# Patient Record
Sex: FEMALE | Race: WHITE | NOT HISPANIC OR LATINO | Employment: FULL TIME | URBAN - METROPOLITAN AREA
[De-identification: names, ages, dates, MRNs, and addresses within clinical notes are randomized per-mention and may not be internally consistent; named-entity substitution may affect disease eponyms.]

---

## 2017-03-20 DIAGNOSIS — M25.551 RIGHT HIP PAIN: Primary | ICD-10-CM

## 2017-04-11 ENCOUNTER — OFFICE VISIT (OUTPATIENT)
Dept: ORTHOPEDICS | Facility: CLINIC | Age: 32
End: 2017-04-11

## 2017-04-11 VITALS — WEIGHT: 104 LBS | BODY MASS INDEX: 22.44 KG/M2 | HEIGHT: 57 IN

## 2017-04-11 DIAGNOSIS — M21.851 ACQUIRED DYSPLASIA OF RIGHT HIP: Primary | ICD-10-CM

## 2017-04-11 NOTE — PROGRESS NOTES
Subjective:   Dalila Rosario is a 32 year old female who is here complaining of right hip pain. She notes lateral hip pain. She has a left leg amputation in 1/16 by . She has scoliosis.     Dalila has a long past history of prior surgeries.  She was treated throughout her earlier years at [facility] with a history of anywhere from 25-30 surgical procedures on her lower extremities.  She most recently underwent an above-the-knee amputation by Dr. Enrique for failed total knee revision.  She has done quite well post amputation.  The problem seems to be that her right hip is causing her more pain and discomfort.  She denies any history of trauma or specific injury to it.  She has had prior surgeries due to fracture on her left hip.  She states the right hip is taking the brunt of her weightbearing as she is primarily on crutches.  She is still awaiting the prosthetic for her left lower extremity.  She states she has primarily right groin discomfort.  It gets worse as she spends more time on her right lower extremity.      She has current stable housing and is living in accessible housing for herself.       Background:   Date of injury: NA   Duration of symptoms: 6 months  Mechanism of Injury: Chronic; Unknown   Aggravating factors: walking  Relieving Factors: rest  Prior Evaluation: Prior Physician Evalutation: 2 years ago by  11/15    PAST MEDICAL, SOCIAL, SURGICAL AND FAMILY HISTORY: She  has a past medical history of Anxiety; Attempted suicide (H); History of blood transfusion; Major depression; Myelodysplasia of the spinal cord (H); OCD (obsessive compulsive disorder); and Other chronic pain. She also has no past medical history of PONV (postoperative nausea and vomiting).  She  has a past surgical history that includes orthopedic surgery; GYN surgery; Arthroplasty revision knee (Left, 8/3/2015); Remove hardware lower extremity (Left, 8/3/2015); Irrigation and debridement knee, combined  "(Left, 11/30/2015); Exchange poly component arthroplasty knee (Left, 11/30/2015); Amputate revision stump lower extremity (Left, 1/9/2016); Amputate leg above knee (Left, 1/7/2016); and Remove hardware arthroplasty knee (Left, 1/7/2016).  Her family history is not on file.  She reports that she has been smoking Cigarettes.  She has been smoking about 0.50 packs per day. She has never used smokeless tobacco. She reports that she does not drink alcohol or use illicit drugs.    ALLERGIES: She is allergic to droperidol and benzoin tincture [benzoin].    CURRENT MEDICATIONS: She has a current medication list which includes the following prescription(s): mirtazapine, bupropion, gabapentin, acetaminophen, hydroxyzine, ciprofloxacin, hydrocodone-acetaminophen, lamotrigine, methocarbamol, hydromorphone, aspirin, quetiapine, order for dme, order for dme, and order for dme.     REVIEW OF SYSTEMS: 12 point review of systems is negative except as noted above.     Exam:   Ht 4' 8.5\" (1.435 m)  Wt 104 lb (47.2 kg)  BMI 22.91 kg/m2      CONSTITUTIONIAL: healthy, alert and no distress  HEAD: Normocephalic. No masses, lesions, tenderness or abnormalities  SKIN: no suspicious lesions or rashes  NEUROLOGIC: Non-focal  PSYCHIATRIC: mentation appears normal.  RIGHT HIP:  She has increased range of motion in internal rotation, external rotation.  She has pain with full flexion as well as full internal rotation and external rotation and pain as noted in the groin.  She has positive FADIR and positive KOFI.  She is mildly tender to palpation in the femoral triangle and moderately tender to palpation over the greater trochanter.      IMAGING:  Radiographs are reviewed and demonstrate left hip intramedullary nail as well as right hip dysplasia.  There is no evidence of degenerative changes.      ASSESSMENT:  Dalila is a 32-year-old female who has increased weightbearing on the right lower extremity, particularly with a load and unloading " fashion as she maneuvers around on a single lower extremity.  She has been delayed in getting her prosthesis and that has a clear and direct effect on the right hip.  She does have some underlying dysplasia.  With this juncture, we will proceed with an MRI of the hip to evaluate her articular cartilage.  If she has predominantly degenerative changes, then we will attempt an intraarticular corticosteroid injection.  If she has a minimum of degenerative changes, then I am going to have her see one of our hip surgeons to talk about potential management or other options.  She is not going to follow up with me pending the MRI, and we discussed this.  We will do this via the telephone and she is aware of how our options are laid out.  If something is unexpected, then by all means I will call her and have her return for followup.        All questions were answered.

## 2017-04-11 NOTE — LETTER
4/11/2017      RE: Dalila Rosario  [address]    [facility] [address]        Subjective:   Dalila Rosario is a 32 year old female who is here complaining of right hip pain. She notes lateral hip pain. She has a left leg amputation in 1/16 by . She has scoliosis.     Dalila has a long past history of prior surgeries.  She was treated throughout her earlier years at [facility] with a history of anywhere from 25-30 surgical procedures on her lower extremities.  She most recently underwent an above-the-knee amputation by Dr. Enrique for failed total knee revision.  She has done quite well post amputation.  The problem seems to be that her right hip is causing her more pain and discomfort.  She denies any history of trauma or specific injury to it.  She has had prior surgeries due to fracture on her left hip.  She states the right hip is taking the brunt of her weightbearing as she is primarily on crutches.  She is still awaiting the prosthetic for her left lower extremity.  She states she has primarily right groin discomfort.  It gets worse as she spends more time on her right lower extremity.      She has current stable housing and is living in accessible housing for herself.       Background:   Date of injury: NA   Duration of symptoms: 6 months  Mechanism of Injury: Chronic; Unknown   Aggravating factors: walking  Relieving Factors: rest  Prior Evaluation: Prior Physician Evalutation: 2 years ago by  11/15    PAST MEDICAL, SOCIAL, SURGICAL AND FAMILY HISTORY: She  has a past medical history of Anxiety; Attempted suicide (H); History of blood transfusion; Major depression; Myelodysplasia of the spinal cord (H); OCD (obsessive compulsive disorder); and Other chronic pain. She also has no past medical history of PONV (postoperative nausea and vomiting).  She  has a past surgical history that includes orthopedic surgery; GYN surgery; Arthroplasty revision knee (Left, 8/3/2015); Remove  "hardware lower extremity (Left, 8/3/2015); Irrigation and debridement knee, combined (Left, 11/30/2015); Exchange poly component arthroplasty knee (Left, 11/30/2015); Amputate revision stump lower extremity (Left, 1/9/2016); Amputate leg above knee (Left, 1/7/2016); and Remove hardware arthroplasty knee (Left, 1/7/2016).  Her family history is not on file.  She reports that she has been smoking Cigarettes.  She has been smoking about 0.50 packs per day. She has never used smokeless tobacco. She reports that she does not drink alcohol or use illicit drugs.    ALLERGIES: She is allergic to droperidol and benzoin tincture [benzoin].    CURRENT MEDICATIONS: She has a current medication list which includes the following prescription(s): mirtazapine, bupropion, gabapentin, acetaminophen, hydroxyzine, ciprofloxacin, hydrocodone-acetaminophen, lamotrigine, methocarbamol, hydromorphone, aspirin, quetiapine, order for dme, order for dme, and order for dme.     REVIEW OF SYSTEMS: 12 point review of systems is negative except as noted above.     Exam:   Ht 4' 8.5\" (1.435 m)  Wt 104 lb (47.2 kg)  BMI 22.91 kg/m2      CONSTITUTIONIAL: healthy, alert and no distress  HEAD: Normocephalic. No masses, lesions, tenderness or abnormalities  SKIN: no suspicious lesions or rashes  NEUROLOGIC: Non-focal  PSYCHIATRIC: mentation appears normal.  RIGHT HIP:  She has increased range of motion in internal rotation, external rotation.  She has pain with full flexion as well as full internal rotation and external rotation and pain as noted in the groin.  She has positive FADIR and positive KOFI.  She is mildly tender to palpation in the femoral triangle and moderately tender to palpation over the greater trochanter.      IMAGING:  Radiographs are reviewed and demonstrate left hip intramedullary nail as well as right hip dysplasia.  There is no evidence of degenerative changes.      ASSESSMENT:  Dalila is a 32-year-old female who has " increased weightbearing on the right lower extremity, particularly with a load and unloading fashion as she maneuvers around on a single lower extremity.  She has been delayed in getting her prosthesis and that has a clear and direct effect on the right hip.  She does have some underlying dysplasia.  With this juncture, we will proceed with an MRI of the hip to evaluate her articular cartilage.  If she has predominantly degenerative changes, then we will attempt an intraarticular corticosteroid injection.  If she has a minimum of degenerative changes, then I am going to have her see one of our hip surgeons to talk about potential management or other options.  She is not going to follow up with me pending the MRI, and we discussed this.  We will do this via the telephone and she is aware of how our options are laid out.  If something is unexpected, then by all means I will call her and have her return for followup.        All questions were answered.     Yefri Grajeda MD

## 2017-04-11 NOTE — MR AVS SNAPSHOT
After Visit Summary   4/11/2017    Dalila Rosario    MRN: [id number]           Patient Information     Date Of Birth          1985        Visit Information        Provider Department      4/11/2017 10:00 AM Yefri Grajeda MD [facility]        Today's Diagnoses     Acquired dysplasia of right hip    -  1       Follow-ups after your visit        Your next 10 appointments already scheduled     Apr 20, 2017 10:15 AM CDT   MR LOWER EXTREMITY JOINT RIGHT W/O CONTRAST [facility]   [facility]    [address] [place] [address] [id number] [phone]           Take your medicines as usual, unless your doctor tells you not to. Bring a list of your current medicines to your exam (including vitamins, minerals and over-the-counter drugs). Also bring the results of similar scans you may have had.  Please remove any body piercings and hair extensions before you arrive.  Follow your doctor s orders. If you do not, we may have to postpone your exam.  You will not have contrast for this exam. You do not need to do anything special to prepare.  The MRI machine uses a strong magnet. Please wear clothes without metal (snaps, zippers). A sweatsuit works well, or we may give you a hospital gown.   **IMPORTANT** THE INSTRUCTIONS BELOW ARE ONLY FOR THOSE PATIENTS WHO HAVE BEEN TOLD THEY WILL RECEIVE SEDATION OR GENERAL ANESTHESIA DURING THEIR MRI PROCEDURE:  IF YOU WILL RECEIVE SEDATION (take medicine to help you relax during your exam):   You must get the medicine from your doctor before you arrive. Bring the medicine to the exam. Do not take it at home.   Arrive one hour early. Bring someone who can take you home after the test. Your medicine will make you sleepy. After the exam, you may not drive, take a bus or take a taxi by yourself.   No eating 8 hours before your exam. You may have clear liquids up until 4 hours before your exam.  (Clear liquids include water, clear tea, black coffee and fruit juice without pulp.)  IF YOU WILL RECEIVE ANESTHESIA (be asleep for your exam):   Arrive 1 1/2 hours early. Bring someone who can take you home after the test. You may not drive, take a bus or take a taxi by yourself.   No eating 8 hours before your exam. You may have clear liquids up until 4 hours before your exam. (Clear liquids include water, clear tea, black coffee and fruit juice without pulp.)   You will spend four to five hours in the recovery room.  Please call the Imaging Department at your exam site with any questions.              Future tests that were ordered for you today     Open Future Orders        Priority Expected Expires Ordered    MRI Lower extremity joint w/o contrast rt* Routine  2018            Who to contact     Please call your clinic at [phone] to:    Ask questions about your health    Make or cancel appointments    Discuss your medicines    Learn about your test results    Speak to your doctor   If you have compliments or concerns about an experience at your clinic, or if you wish to file a complaint, please contact [facility] Patient Relations at [phone] or email us at [email or web address]         Additional Information About Your Visit        [employer] Information     Nomacorc is an electronic gateway that provides easy, online access to your medical records. With Nom[employer]orc, you can request a clinic appointment, read your test results, renew a prescription or communicate with your care team.     To sign up for [employer] visit the website at [email or web address]   You will be asked to enter the access code listed below, as well as some personal information. Please follow the directions to create your username and password.     Your access code is: [email or web address]  Expires: 7/10/2017  3:55 PM     Your access code will  in 90 days. If you need help or a new  "code, please contact your [facility] or call [phone] for assistance.        Care EveryWhere ID     This is your Care EveryWhere ID. This could be used by other organizations to access your [facility] medical records  [id number]        Your Vitals Were     Height BMI (Body Mass Index)                4' 8.5\" (1.435 m) 22.91 kg/m2           Blood Pressure from Last 3 Encounters:   01/15/16 130/79   01/06/16 124/76   12/18/15 123/76    Weight from Last 3 Encounters:   04/11/17 104 lb (47.2 kg)   01/07/16 132 lb 11.5 oz (60.2 kg)   01/06/16 129 lb (58.5 kg)               Primary Care Provider Office Phone # Fax #    Alejandrina Bui [phone]       [facility] [address] [place] [id number]        Thank you!     Thank you for choosing [facility]  for your care. Our goal is always to provide you with excellent care. Hearing back from our patients is one way we can continue to improve our services. Please take a few minutes to complete the written survey that you may receive in the mail after your visit with us. Thank you!             Your Updated Medication List - Protect others around you: Learn how to safely use, store and throw away your medicines at www.disposemymeds.org.          This list is accurate as of: 4/11/17  3:55 PM.  Always use your most recent med list.                   Brand Name Dispense Instructions for use    acetaminophen 500 MG tablet    TYLENOL    1 Bottle    Take 1 tablet (500 mg) by mouth every 4 hours       aspirin 325 MG tablet     30 tablet    Take 1 tablet (325 mg) by mouth daily       buPROPion 150 MG 24 hr tablet    WELLBUTRIN XL    30 tablet    Take 1 tablet (150 mg) by mouth every morning       ciprofloxacin 500 MG tablet    CIPRO    20 tablet    Take 1 tablet (500 mg) by mouth 2 times daily       gabapentin 300 MG capsule    NEURONTIN    90 capsule    Take 3 capsules (900 mg) by mouth At " Bedtime       HYDROcodone-acetaminophen 5-325 MG per tablet    NORCO    15 tablet    Take 1 tablet by mouth every 6 hours as needed for moderate to severe pain       HYDROmorphone 2 MG tablet    DILAUDID    100 tablet    Take 1-2 tablets (2-4 mg) by mouth every 3 hours as needed for moderate to severe pain       hydrOXYzine 25 MG tablet    ATARAX    120 tablet    Take 1-2 tablets (25-50 mg) by mouth every 4 hours as needed for other (adjuvant pain)       lamoTRIgine 200 MG tablet    LaMICtal    30 tablet    Take 1 tablet (200 mg) by mouth daily       methocarbamol 750 MG tablet    ROBAXIN    30 tablet    Take 1 tablet (750 mg) by mouth 4 times daily as needed for muscle spasms       * order for DME     1 Units    Wheelchair       * order for DME     1 Units    Evaluation for wheel chair. Pt currently has wheel chair, is not working to meet her needs. Evaluation to determine exactly what is needed.       * order for DME     200 catheter    Equipment being ordered: 14 french urinary catheter  NDC: [address]-08 Name: Self-cath, 14-Fr 6 inch catheter  May use 6-10 per day as needed. Should last for approximately one month       QUEtiapine 25 MG tablet    SEROquel    60 tablet    Take 1-2 tablets (25-50 mg) by mouth nightly as needed (sleep)       REMERON PO      Take 30 mg by mouth At Bedtime       * Notice:  This list has 3 medication(s) that are the same as other medications prescribed for you. Read the directions carefully, and ask your doctor or other care provider to review them with you.

## 2017-04-20 ENCOUNTER — RADIANT APPOINTMENT (OUTPATIENT)
Dept: MRI IMAGING | Facility: CLINIC | Age: 32
End: 2017-04-20
Attending: FAMILY MEDICINE
Payer: COMMERCIAL

## 2017-04-20 DIAGNOSIS — M21.851 ACQUIRED DYSPLASIA OF RIGHT HIP: ICD-10-CM

## 2017-04-20 PROCEDURE — 73721 MRI JNT OF LWR EXTRE W/O DYE: CPT | Mod: RT | Performed by: RADIOLOGY

## 2017-05-01 DIAGNOSIS — M25.551 RIGHT HIP PAIN: ICD-10-CM

## 2017-05-01 DIAGNOSIS — M16.31 OSTEOARTHRITIS RESULTING FROM RIGHT HIP DYSPLASIA: Primary | ICD-10-CM

## 2017-06-07 ENCOUNTER — PRE VISIT (OUTPATIENT)
Dept: ORTHOPEDICS | Facility: CLINIC | Age: 32
End: 2017-06-07

## 2017-06-07 NOTE — TELEPHONE ENCOUNTER
Tsering from [facility] called and said she will pull the charts and send pt's recs once she gathers all of pt's recs.

## 2017-06-07 NOTE — TELEPHONE ENCOUNTER
1.  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 Right hip pain hx of hip surgery at young age, appt per pt.  Ref by Dr. Grajeda in Sports Med, med and imaging in Epic chart.  2.  Referring provider: Dr. Grajeda   3.  Call to patient (Yes / No - short description): No, pt was referred.   4.  Previous care at / records requested from:  [facility] Taras HERNANDEZ -- Recs are in Epic / Images in PACS   [facility] -- Recs are in Epic / Images in PACS  [facility] HERNANDEZ -- Recs are in [facility] - Attempt to fax cover sheet to emily bedoya

## 2017-06-22 ENCOUNTER — OFFICE VISIT (OUTPATIENT)
Dept: ORTHOPEDICS | Facility: CLINIC | Age: 32
End: 2017-06-22

## 2017-06-22 VITALS — HEIGHT: 57 IN

## 2017-06-22 DIAGNOSIS — M16.31 OSTEOARTHRITIS RESULTING FROM RIGHT HIP DYSPLASIA: Primary | ICD-10-CM

## 2017-06-22 ASSESSMENT — ACTIVITIES OF DAILY LIVING (ADL)
ADL_SUBSCALE_SCORE: 22.05
ADL_SUM: 53
ADL_MEAN: 3.11

## 2017-06-22 ASSESSMENT — ENCOUNTER SYMPTOMS
NECK PAIN: 1
NUMBNESS: 1
INCREASED ENERGY: 1
FEVER: 0
MUSCLE CRAMPS: 1
LOSS OF CONSCIOUSNESS: 0
ALTERED TEMPERATURE REGULATION: 1
DYSURIA: 0
MEMORY LOSS: 0
TACHYCARDIA: 1
WEAKNESS: 1
PARALYSIS: 0
JOINT SWELLING: 1
HYPERTENSION: 0
POLYPHAGIA: 0
FATIGUE: 1
CLAUDICATION: 0
EXERCISE INTOLERANCE: 1
ORTHOPNEA: 0
SEIZURES: 0
HALLUCINATIONS: 0
POLYDIPSIA: 0
DIFFICULTY URINATING: 0
HEMATURIA: 0
INSOMNIA: 1
PALPITATIONS: 0
MUSCLE WEAKNESS: 1
MYALGIAS: 1
DISTURBANCES IN COORDINATION: 1
WEIGHT GAIN: 1
TREMORS: 0
STIFFNESS: 1
NERVOUS/ANXIOUS: 1
HYPOTENSION: 0
LIGHT-HEADEDNESS: 1
DEPRESSION: 1
SPEECH CHANGE: 0
LEG SWELLING: 1
SYNCOPE: 0
ARTHRALGIAS: 1
DECREASED CONCENTRATION: 0
FLANK PAIN: 1
HEADACHES: 1
WEIGHT LOSS: 1
LEG PAIN: 1
DIZZINESS: 1
PANIC: 1
BACK PAIN: 1
DECREASED APPETITE: 1
NIGHT SWEATS: 0
TINGLING: 1
SLEEP DISTURBANCES DUE TO BREATHING: 0
CHILLS: 0

## 2017-06-22 ASSESSMENT — HOOS S4: HOW SEVERE IS YOUR HIP JOINT STIFFNESS AFTER FIRST WAKENING IN THE MORNING?: MODERATE

## 2017-06-22 NOTE — NURSING NOTE
"Reason For Visit:   Chief Complaint   Patient presents with     Consult     Pt. states that she is here today for Right Hip Pain. She been having ongoing pain for 6 months, no injuries. Referring:  ODALIS YU       Pain Assessment  Patient Currently in Pain: Yes  0-10 Pain Scale: 8  Primary Pain Location: Hip  Pain Orientation: Right  Pain Descriptors: Discomfort  Alleviating Factors: NSAIDS, Rest  Aggravating Factors: Movement, Walking, Standing, Sitting               HEIGHT: 4' 8.5\", WEIGHT: 0 lbs 0 oz, BMI: There is no height or weight on file to calculate BMI.      Current Outpatient Prescriptions   Medication Sig Dispense Refill     Mirtazapine (REMERON PO) Take 30 mg by mouth At Bedtime       ciprofloxacin (CIPRO) 500 MG tablet Take 1 tablet (500 mg) by mouth 2 times daily 20 tablet 0     buPROPion (WELLBUTRIN XL) 150 MG 24 hr tablet Take 1 tablet (150 mg) by mouth every morning 30 tablet 5     gabapentin (NEURONTIN) 300 MG capsule Take 3 capsules (900 mg) by mouth At Bedtime 90 capsule 5     lamoTRIgine (LAMICTAL) 200 MG tablet Take 1 tablet (200 mg) by mouth daily 30 tablet 0     methocarbamol (ROBAXIN) 750 MG tablet Take 1 tablet (750 mg) by mouth 4 times daily as needed for muscle spasms 30 tablet 0     acetaminophen (TYLENOL) 500 MG tablet Take 1 tablet (500 mg) by mouth every 4 hours 1 Bottle 0     aspirin 325 MG tablet Take 1 tablet (325 mg) by mouth daily 30 tablet 0     hydrOXYzine (ATARAX) 25 MG tablet Take 1-2 tablets (25-50 mg) by mouth every 4 hours as needed for other (adjuvant pain) 120 tablet 1     QUEtiapine (SEROQUEL) 25 MG tablet Take 1-2 tablets (25-50 mg) by mouth nightly as needed (sleep) 60 tablet 0     order for DME Equipment being ordered: 14 french urinary catheter    NDC: [address]-11  Name: Self-cath, 14-Fr 6 inch catheter    May use 6-10 per day as needed. Should last for approximately one month 200 catheter 5     ORDER FOR DME Evaluation for wheel chair. Pt currently has wheel " chair, is not working to meet her needs. Evaluation to determine exactly what is needed. 1 Units 0     ORDER FOR DME Wheelchair 1 Units 0          Allergies   Allergen Reactions     Droperidol Anaphylaxis     Benzoin Tincture [Benzoin] Blisters

## 2017-06-22 NOTE — PROGRESS NOTES
Chief Complaint: Consult (Pt. states that she is here today for Right Hip Pain. She been having ongoing pain for 6 months, no injuries. Referring:  ODALIS YU)      Physician:  Odalis Yu    HPI: Dalila Rosario is a 32 year old female who presents today for evaluation of her right hip    Symptom Profile  Location of symptoms:  Latera hip at the level of the greater trochanter. She also endorses some groin pain   Onset: insidious  Duration of symptoms: about 6 months   Quality of symptoms: sharp  Severity: moderate to severe   Alleviate: activity modification   Exacerbating: activities   Previous Treatments: Previous treatments include activity modification, oral pain medication    No steroid injection, no physical therapy.     Current Status:  Results of the patient s Hip Disability and Osteoarthritis Outcome Score (HOOS)  are as follows (0-100 scales with 100 being the theoretical best):  Pain: 22.5  Symptoms:35  ADLs:22.05  Sports/Recreation:6.25  Quality of Life:12.5  ([email or web address].nu/)    MEDICAL HISTORY:   Past Medical History:   Diagnosis Date     Anxiety      Attempted suicide (H)      History of blood transfusion      Major depression      Myelodysplasia of the spinal cord (H)      OCD (obsessive compulsive disorder)      Other chronic pain    myelo level: pt does not know    Pertinent negatives:  Patient has no history of DVT or PE. Discussed risk factors.    Medications:     Current Outpatient Prescriptions:      Mirtazapine (REMERON PO), Take 30 mg by mouth At Bedtime, Disp: , Rfl:      ciprofloxacin (CIPRO) 500 MG tablet, Take 1 tablet (500 mg) by mouth 2 times daily, Disp: 20 tablet, Rfl: 0     buPROPion (WELLBUTRIN XL) 150 MG 24 hr tablet, Take 1 tablet (150 mg) by mouth every morning, Disp: 30 tablet, Rfl: 5     gabapentin (NEURONTIN) 300 MG capsule, Take 3 capsules (900 mg) by mouth At Bedtime, Disp: 90 capsule, Rfl: 5     lamoTRIgine (LAMICTAL) 200 MG tablet, Take 1 tablet (200 mg) by  mouth daily, Disp: 30 tablet, Rfl: 0     methocarbamol (ROBAXIN) 750 MG tablet, Take 1 tablet (750 mg) by mouth 4 times daily as needed for muscle spasms, Disp: 30 tablet, Rfl: 0     acetaminophen (TYLENOL) 500 MG tablet, Take 1 tablet (500 mg) by mouth every 4 hours, Disp: 1 Bottle, Rfl: 0     aspirin 325 MG tablet, Take 1 tablet (325 mg) by mouth daily, Disp: 30 tablet, Rfl: 0     hydrOXYzine (ATARAX) 25 MG tablet, Take 1-2 tablets (25-50 mg) by mouth every 4 hours as needed for other (adjuvant pain), Disp: 120 tablet, Rfl: 1     QUEtiapine (SEROQUEL) 25 MG tablet, Take 1-2 tablets (25-50 mg) by mouth nightly as needed (sleep), Disp: 60 tablet, Rfl: 0     order for DME, Equipment being ordered: 14 french urinary catheter  NDC: [address]-90 Name: Self-cath, 14-Fr 6 inch catheter  May use 6-10 per day as needed. Should last for approximately one month, Disp: 200 catheter, Rfl: 5     ORDER FOR DME, Evaluation for wheel chair. Pt currently has wheel chair, is not working to meet her needs. Evaluation to determine exactly what is needed., Disp: 1 Units, Rfl: 0     ORDER FOR DME, Wheelchair, Disp: 1 Units, Rfl: 0    Allergies: Droperidol and Benzoin tincture [benzoin]    SURGICAL HISTORY:   Past Surgical History:   Procedure Laterality Date     AMPUTATE LEG ABOVE KNEE Left 2016    Procedure: AMPUTATE LEG ABOVE KNEE;  Surgeon: Shubham Enrique MD;  [facility]     AMPUTATE REVISION STUMP LOWER EXTREMITY Left 2016    Procedure: AMPUTATE REVISION STUMP LOWER EXTREMITY;  Surgeon: Shubham Enrique MD;  [facility]     ARTHROPLASTY REVISION KNEE Left 8/3/2015    Procedure: ARTHROPLASTY REVISION KNEE;  Surgeon: Shubham Enrique MD;  [facility]     EXCHANGE POLY COMPONENT ARTHROPLASTY KNEE Left 2015    Procedure: EXCHANGE POLY COMPONENT ARTHROPLASTY KNEE;  Surgeon: Shubham Enrique MD;  [facility]     GYN SURGERY       x3     IRRIGATION AND DEBRIDEMENT KNEE, COMBINED Left  "11/30/2015    Procedure: COMBINED IRRIGATION AND DEBRIDEMENT KNEE;  Surgeon: Shubham Enrique MD;  [facility]     ORTHOPEDIC SURGERY      22 on legs and bladder due to myelodisplasia of spinal column.      REMOVE HARDWARE ARTHROPLASTY KNEE Left 1/7/2016    Procedure: REMOVE HARDWARE ARTHROPLASTY KNEE;  Surgeon: Shubham Enrique MD;  [facility]     REMOVE HARDWARE LOWER EXTREMITY Left 8/3/2015    Procedure: REMOVE HARDWARE LOWER EXTREMITY;  Surgeon: Shubham Enrique MD;  [facility]   left hip proximal femoral osteotomy non-union and placement of a DHS per pt report    FAMILY HISTORY: No family history on file.    SOCIAL HISTORY:   Social History   Substance Use Topics     Smoking status: Current Every Day Smoker     Packs/day: 0.50     Types: Cigarettes     Smokeless tobacco: Never Used     Alcohol use No      Comment: Sober x 1 year   not working currently, she is an  with a masters  Lives alone in friends house independently    REVIEW OF SYSTEMS:  The comprehensive review of systems from the intake form was reviewed with the patient.  No fever, weight change or fatigue. No dry eyes. No oral ulcers, sore throat or voice change. No palpitations, syncope, angina or edema.  No chest pain, excessive sleepiness, shortness of breath or hemoptysis.   No abdominal pain, nausea, vomiting, diarrhea or heartburn.  No skin rash. No focal weakness or numbness. No bleeding or lymphadenopathy. No rhinitis or hives.     Exam:  On physical examination the patient appears the stated age, is in no acute distress, affectThe is appropriate, and breathing is non-labored.  Vitals are documented in the EMR and have been reviewed:    Ht 1.435 m (4' 8.5\")  4' 8.5\"  There is no height or weight on file to calculate BMI.    Rises from chair: with effort on her single lower extremity     Log role with pain radiating down the leg below the knee    RIGHT hip subjective:  Abd:10  Add:20 with gaurding  PFC:  Flexion: " 90  IRF:10 c-sign  ERF: skipped 2/2 pain   Impingement test:  Resisted straight leg raise:  KOFI:    Distally, the circulatory, motor, and sensation exam is intact with 5/5 EHL, gastroc-soleus, and tibialis anterior.  Sensation to light touch is intact.  Dorsalis pedis and posterior tibialis pulses are palpable.  There are no sores on the feet, no bruising, and no lymphedema.    X-rays:   Right hip with classic acetabular dysplasia with LCE of -6   MRI with a large subchondral cysts    Assessment: This is a 32 year old with severe acetabular dysplasia and an MR to suggest that her cartilage is not appropriate for a redirectional osteotomy. We discussed the non-operative management options available including intra-articular steroid injection.      Plan:  Referral to non-operative orthopaedics for consideration of intra-articular steroid injection.     Answers for HPI/ROS submitted by the patient on 6/22/2017   General Symptoms: Yes  Skin Symptoms: No  HENT Symptoms: No  EYE SYMPTOMS: No  HEART SYMPTOMS: Yes  LUNG SYMPTOMS: No  INTESTINAL SYMPTOMS: No  URINARY SYMPTOMS: Yes  GYNECOLOGIC SYMPTOMS: No  BREAST SYMPTOMS: No  SKELETAL SYMPTOMS: Yes  BLOOD SYMPTOMS: No  NERVOUS SYSTEM SYMPTOMS: Yes  MENTAL HEALTH SYMPTOMS: Yes  Fever: No  Loss of appetite: Yes  Weight loss: Yes  Weight gain: Yes  Fatigue: Yes  Night sweats: No  Chills: No  Increased stress: Yes  Excessive hunger: No  Excessive thirst: No  Feeling hot or cold when others believe the temperature is normal: Yes  Loss of height: No  Post-operative complications: No  Surgical site pain: No  Hallucinations: No  Change in or Loss of Energy: Yes  Hyperactivity: Yes  Confusion: No  Chest pain or pressure: Yes  Fast or irregular heartbeat: No  Pain in legs with walking: Yes  Swelling in feet or ankles: Yes  Trouble breathing while lying down: No  Fingers or Toes appear blue: No  High blood pressure: No  Low blood pressure: No  Fainting: No  Murmurs: Yes  Chest pain  on exertion: Yes  Chest pain at rest: Yes  Cramping pain in leg during exercise: No  Pacemaker: No  Varicose veins: No  Edema or swelling: No  Fast heart beat: Yes  Wake up at night with shortness of breath: No  Heart flutters: No  Light-headedness: Yes  Exercise intolerance: Yes  Trouble holding urine or incontinence: Yes  Pain or burning: No  Trouble starting or stopping: No  Increased frequency of urination: No  Blood in urine: No  Decreased frequency of urination: Yes  Frequent nighttime urination: Yes  Flank pain: Yes  Difficulty emptying bladder: No  Back pain: Yes  Muscle aches: Yes  Neck pain: Yes  Swollen joints: Yes  Joint pain: Yes  Bone pain: Yes  Muscle cramps: Yes  Muscle weakness: Yes  Joint stiffness: Yes  Bone fracture: No  Trouble with coordination: Yes  Dizziness or trouble with balance: Yes  Fainting or black-out spells: No  Memory loss: No  Headache: Yes  Seizures: No  Speech problems: No  Tingling: Yes  Tremor: No  Weakness: Yes  Difficulty walking: Yes  Paralysis: No  Numbness: Yes  Nervous or Anxious: Yes  Depression: Yes  Trouble sleeping: Yes  Trouble thinking or concentrating: No  Mood changes: Yes  Panic attacks: Yes

## 2017-06-22 NOTE — LETTER
6/22/2017       RE: Dalila Rosario  [address] [facility] [address]     Dear Colleague,    Thank you for referring your patient, Dalila Rosario, to the [facility] at [facility]. Please see a copy of my visit note below.    Chief Complaint: Consult (Pt. states that she is here today for Right Hip Pain. She been having ongoing pain for 6 months, no injuries. Referring:  ODALIS YU)    Physician:  Odalis Yu    HPI: Dalila Rosario is a 32 year old female who presents today for evaluation of her right hip    Symptom Profile  Location of symptoms:  Latera hip at the level of the greater trochanter. She also endorses some groin pain   Onset: insidious  Duration of symptoms: about 6 months   Quality of symptoms: sharp  Severity: moderate to severe   Alleviate: activity modification   Exacerbating: activities   Previous Treatments: Previous treatments include activity modification, oral pain medication    No steroid injection, no physical therapy.     Current Status:  Results of the patient s Hip Disability and Osteoarthritis Outcome Score (HOOS)  are as follows (0-100 scales with 100 being the theoretical best):  Pain: 22.5  Symptoms:35  ADLs:22.05  Sports/Recreation:6.25  Quality of Life:12.5  ([email or web address].[email or web address]/)    MEDICAL HISTORY:   Past Medical History:   Diagnosis Date     Anxiety      Attempted suicide (H)      History of blood transfusion      Major depression      Myelodysplasia of the spinal cord (H)      OCD (obsessive compulsive disorder)      Other chronic pain    myelo level: pt does not know    Pertinent negatives:  Patient has no history of DVT or PE. Discussed risk factors.    Medications:     Current Outpatient Prescriptions:      Mirtazapine (REMERON PO), Take 30 mg by mouth At Bedtime, Disp: , Rfl:      ciprofloxacin (CIPRO) 500 MG tablet, Take 1 tablet (500 mg) by mouth 2 times daily, Disp: 20 tablet, Rfl: 0      buPROPion (WELLBUTRIN XL) 150 MG 24 hr tablet, Take 1 tablet (150 mg) by mouth every morning, Disp: 30 tablet, Rfl: 5     gabapentin (NEURONTIN) 300 MG capsule, Take 3 capsules (900 mg) by mouth At Bedtime, Disp: 90 capsule, Rfl: 5     lamoTRIgine (LAMICTAL) 200 MG tablet, Take 1 tablet (200 mg) by mouth daily, Disp: 30 tablet, Rfl: 0     methocarbamol (ROBAXIN) 750 MG tablet, Take 1 tablet (750 mg) by mouth 4 times daily as needed for muscle spasms, Disp: 30 tablet, Rfl: 0     acetaminophen (TYLENOL) 500 MG tablet, Take 1 tablet (500 mg) by mouth every 4 hours, Disp: 1 Bottle, Rfl: 0     aspirin 325 MG tablet, Take 1 tablet (325 mg) by mouth daily, Disp: 30 tablet, Rfl: 0     hydrOXYzine (ATARAX) 25 MG tablet, Take 1-2 tablets (25-50 mg) by mouth every 4 hours as needed for other (adjuvant pain), Disp: 120 tablet, Rfl: 1     QUEtiapine (SEROQUEL) 25 MG tablet, Take 1-2 tablets (25-50 mg) by mouth nightly as needed (sleep), Disp: 60 tablet, Rfl: 0     order for DME, Equipment being ordered: 14 french urinary catheter  NDC: [address]-07 Name: Self-cath, 14-Fr 6 inch catheter  May use 6-10 per day as needed. Should last for approximately one month, Disp: 200 catheter, Rfl: 5     ORDER FOR DME, Evaluation for wheel chair. Pt currently has wheel chair, is not working to meet her needs. Evaluation to determine exactly what is needed., Disp: 1 Units, Rfl: 0     ORDER FOR DME, Wheelchair, Disp: 1 Units, Rfl: 0    Allergies: Droperidol and Benzoin tincture [benzoin]  SURGICAL HISTORY:   Past Surgical History:   Procedure Laterality Date     AMPUTATE LEG ABOVE KNEE Left 1/7/2016    Procedure: AMPUTATE LEG ABOVE KNEE;  Surgeon: Shubham Enrique MD;  [facility]     AMPUTATE REVISION STUMP LOWER EXTREMITY Left 1/9/2016    Procedure: AMPUTATE REVISION STUMP LOWER EXTREMITY;  Surgeon: Shubham Enrique MD;  [facility]     ARTHROPLASTY REVISION KNEE Left 8/3/2015    Procedure: ARTHROPLASTY REVISION KNEE;  Surgeon:  Shubham Enrique MD;  [facility]     EXCHANGE POLY COMPONENT ARTHROPLASTY KNEE Left 2015    Procedure: EXCHANGE POLY COMPONENT ARTHROPLASTY KNEE;  Surgeon: Shubham Enrique MD;  [facility]     GYN SURGERY       x3     IRRIGATION AND DEBRIDEMENT KNEE, COMBINED Left 2015    Procedure: COMBINED IRRIGATION AND DEBRIDEMENT KNEE;  Surgeon: Shubham Enrique MD;  [facility]     ORTHOPEDIC SURGERY      22 on legs and bladder due to myelodisplasia of spinal column.      REMOVE HARDWARE ARTHROPLASTY KNEE Left 2016    Procedure: REMOVE HARDWARE ARTHROPLASTY KNEE;  Surgeon: Shubham Enrique MD;  [facility]     REMOVE HARDWARE LOWER EXTREMITY Left 8/3/2015    Procedure: REMOVE HARDWARE LOWER EXTREMITY;  Surgeon: Shubham Enrique MD;  [facility]   left hip proximal femoral osteotomy non-union and placement of a DHS per pt report    FAMILY HISTORY: No family history on file.    SOCIAL HISTORY:   Social History   Substance Use Topics     Smoking status: Current Every Day Smoker     Packs/day: 0.50     Types: Cigarettes     Smokeless tobacco: Never Used     Alcohol use No      Comment: Sober x 1 year   not working currently, she is an  with a masters  Lives alone in [employer] house independently    REVIEW OF SYSTEMS:  The comprehensive review of systems from the intake form was reviewed with the patient.  No fever, weight change or fatigue. No dry eyes. No oral ulcers, sore throat or voice change. No palpitations, syncope, angina or edema.  No chest pain, excessive sleepiness, shortness of breath or hemoptysis.   No abdominal pain, nausea, vomiting, diarrhea or heartburn.  No skin rash. No focal weakness or numbness. No bleeding or lymphadenopathy. No rhinitis or hives.     Exam:  On physical examination the patient appears the stated age, is in no acute distress, affectThe is appropriate, and breathing is non-labored.  Vitals are documented in the EMR and have been  "reviewed:     1.435 m (4' 8.5\")  4' 8.5\"  There is no height or weight on file to calculate BMI.    Rises from chair: with effort on her single lower extremity     Log role with pain radiating down the leg below the knee    RIGHT hip subjective:  Abd:10  Add:20 with gaurding  PFC:  Flexion: 90  IRF:10 c-sign  ERF: skipped 2/2 pain   Impingement test:  Resisted straight leg raise:  KOFI:    Distally, the circulatory, motor, and sensation exam is intact with 5/5 EHL, gastroc-soleus, and tibialis anterior.  Sensation to light touch is intact.  Dorsalis pedis and posterior tibialis pulses are palpable.  There are no sores on the feet, no bruising, and no lymphedema.    X-rays:   Right hip with classic acetabular dysplasia with LCE of -6   MRI with a large subchondral cysts    Assessment: This is a 32 year old with severe acetabular dysplasia and an MR to suggest that her cartilage is not appropriate for a redirectional osteotomy. We discussed the non-operative management options available including intra-articular steroid injection.      Plan:  Referral to non-operative orthopaedics for consideration of intra-articular steroid injection.     Again, thank you for allowing me to participate in the care of your patient.      Sincerely,  Angel Carlson MD  "

## 2017-06-22 NOTE — MR AVS SNAPSHOT
"              After Visit Summary   2017    Dalila Rosario    MRN: [id number]           Patient Information     Date Of Birth          1985        Visit Information        Provider Department      2017 4:30 PM Angel Carlson MD [facility]        Today's Diagnoses     Osteoarthritis resulting from right hip dysplasia    -  1       Follow-ups after your visit        Who to contact     Please call your clinic at [phone] to:    Ask questions about your health    Make or cancel appointments    Discuss your medicines    Learn about your test results    Speak to your doctor   If you have compliments or concerns about an experience at your clinic, or if you wish to file a complaint, please contact [facility] Patient Relations at [phone] or email us at [email or web address]         Additional Information About Your Visit        MyChart Information     The Kernel is an electronic gateway that provides easy, online access to your medical records. With The Kernel, you can request a clinic appointment, read your test results, renew a prescription or communicate with your care team.     To sign up for [employer] visit the website at [email or web address]   You will be asked to enter the access code listed below, as well as some personal information. Please follow the directions to create your username and password.     Your access code is: [email or web address]  Expires: 7/10/2017  3:55 PM     Your access code will  in 90 days. If you need help or a new code, please contact your [facility] or call [phone] for assistance.        Care EveryWhere ID     This is your Care EveryWhere ID. This could be used by other organizations to access your [place] medical records  [id number]        Your Vitals Were     Height                   1.435 m (4' 8.5\")            Blood Pressure from Last 3 Encounters:   01/15/16 130/79 "   01/06/16 124/76   12/18/15 123/76    Weight from Last 3 Encounters:   04/11/17 47.2 kg (104 lb)   01/07/16 60.2 kg (132 lb 11.5 oz)   01/06/16 58.5 kg (129 lb)              Today, you had the following     No orders found for display       Primary Care Provider Office Phone #    Alejandrina Bui [phone]       Counts include 234 beds at the [facility] [address] [facility] [address]  [facility] [address]        Equal Access to Services     LORRAINE WHARTON : Hadii aad ku hadasho Soomaali, waaxda luqadaha, qaybta kaalmada adeegyada, waxay noemyin hayaan adejeanne whitaker . So [facility] [phone].    ATENCIÓN: Si habla español, tiene a dan disposición servicios gratuitos de asistencia lingüística. [facility] [phone].    We comply with applicable federal civil rights laws and [place] laws. We do not discriminate on the basis of race, color, national origin, age, disability sex, sexual orientation or gender identity.            Thank you!     Thank you for choosing [facility]  for your care. Our goal is always to provide you with excellent care. Hearing back from our patients is one way we can continue to improve our services. Please take a few minutes to complete the written survey that you may receive in the mail after your visit with us. Thank you!             Your Updated Medication List - Protect others around you: Learn how to safely use, store and throw away your medicines at www.disposemymeds.org.          This list is accurate as of: 6/22/17  5:39 PM.  Always use your most recent med list.                   Brand Name Dispense Instructions for use Diagnosis    acetaminophen 500 MG tablet    TYLENOL    1 Bottle    Take 1 tablet (500 mg) by mouth every 4 hours    Status post above knee amputation of left lower extremity (H)       aspirin 325 MG tablet     30 tablet    Take 1 tablet (325 mg) by mouth daily    Status post above knee amputation of left lower extremity (H)       buPROPion 150 MG 24 hr tablet     WELLBUTRIN XL    30 tablet    Take 1 tablet (150 mg) by mouth every morning    Major depressive disorder, recurrent, severe without psychotic features (H)       ciprofloxacin 500 MG tablet    CIPRO    20 tablet    Take 1 tablet (500 mg) by mouth 2 times daily    Non-healing surgical wound, initial encounter       gabapentin 300 MG capsule    NEURONTIN    90 capsule    Take 3 capsules (900 mg) by mouth At Bedtime    Status post above knee amputation of left lower extremity (H)       hydrOXYzine 25 MG tablet    ATARAX    120 tablet    Take 1-2 tablets (25-50 mg) by mouth every 4 hours as needed for other (adjuvant pain)    Status post total left knee replacement       lamoTRIgine 200 MG tablet    LaMICtal    30 tablet    Take 1 tablet (200 mg) by mouth daily    Major depressive disorder, recurrent, severe without psychotic features (H)       methocarbamol 750 MG tablet    ROBAXIN    30 tablet    Take 1 tablet (750 mg) by mouth 4 times daily as needed for muscle spasms    Status post above knee amputation of left lower extremity (H)       * order for DME     1 Units    Wheelchair    Left knee pain       * order for DME     1 Units    Evaluation for wheel chair. Pt currently has wheel chair, is not working to meet her needs. Evaluation to determine exactly what is needed.    Left knee pain       * order for DME     200 catheter    Equipment being ordered: 14 french urinary catheter  NDC: [address]-74 Name: Self-cath, 14-Fr 6 inch catheter  May use 6-10 per day as needed. Should last for approximately one month    Neurogenic bladder, NOS       QUEtiapine 25 MG tablet    SEROquel    60 tablet    Take 1-2 tablets (25-50 mg) by mouth nightly as needed (sleep)    Other insomnia       REMERON PO      Take 30 mg by mouth At Bedtime        * Notice:  This list has 3 medication(s) that are the same as other medications prescribed for you. Read the directions carefully, and ask your doctor or other care provider to review them  with you.

## 2017-07-17 NOTE — TELEPHONE ENCOUNTER
Received paper recs along with 1 CD from [facility], will scan recs and forward to [facility].( CD to Film Room)

## 2017-07-18 NOTE — TELEPHONE ENCOUNTER
Disk from [facility] contains digitized imaging dated 1984 thru 9/28/1990 under one study.  7/12/17 is date disk was created, not an actual exam date. [facility] imaging under maiden name - Davion.

## 2017-07-20 ENCOUNTER — TELEPHONE (OUTPATIENT)
Dept: BEHAVIORAL HEALTH | Facility: CLINIC | Age: 32
End: 2017-07-20

## 2017-07-20 NOTE — TELEPHONE ENCOUNTER
S:  7/20/17 Client called for a chemical dependency evaluation.   B:  Client stated she has a hx of Meth use, and she also abuses  Alcohol and Marijuana. She stated she has been in 5 CD [place], but   have only completed 3. Client denies legal issues.   A:  CD evaluation   R:  Client stated wanting a CD eval only, she plans to go to   [facility]. Scheduled for 7/25/17 at [place]. MARITZA

## 2017-07-25 ENCOUNTER — HOSPITAL ENCOUNTER (OUTPATIENT)
Dept: BEHAVIORAL HEALTH | Facility: CLINIC | Age: 32
Discharge: HOME | End: 2017-07-25
Attending: SOCIAL WORKER | Admitting: SOCIAL WORKER
Payer: COMMERCIAL

## 2017-07-25 VITALS
WEIGHT: 116 LBS | SYSTOLIC BLOOD PRESSURE: 107 MMHG | DIASTOLIC BLOOD PRESSURE: 73 MMHG | HEIGHT: 57 IN | BODY MASS INDEX: 25.03 KG/M2 | HEART RATE: 100 BPM

## 2017-07-25 PROCEDURE — H0001 ALCOHOL AND/OR DRUG ASSESS: HCPCS

## 2017-07-25 ASSESSMENT — ANXIETY QUESTIONNAIRES
4. TROUBLE RELAXING: MORE THAN HALF THE DAYS
6. BECOMING EASILY ANNOYED OR IRRITABLE: NEARLY EVERY DAY
5. BEING SO RESTLESS THAT IT IS HARD TO SIT STILL: MORE THAN HALF THE DAYS
7. FEELING AFRAID AS IF SOMETHING AWFUL MIGHT HAPPEN: NOT AT ALL
3. WORRYING TOO MUCH ABOUT DIFFERENT THINGS: SEVERAL DAYS
GAD7 TOTAL SCORE: 12
2. NOT BEING ABLE TO STOP OR CONTROL WORRYING: SEVERAL DAYS
1. FEELING NERVOUS, ANXIOUS, OR ON EDGE: NEARLY EVERY DAY

## 2017-07-25 ASSESSMENT — PAIN SCALES - GENERAL: PAINLEVEL: SEVERE PAIN (6)
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
## 2017-07-25 NOTE — PROGRESS NOTES
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 much? What did they say? (DSM)     \"Everyone, my roommate, my roommate's , brother, kids, mother.\"     B. What did you think about that/ do you think you have a problem with alcohol or drug use?     Meth - yes  Alcohol - yes with liquor. She can't drink enough beer (too much liquid) to maintain a buzz.  Marijuana - yes  Benzos - no  Opiates - no  Cocaine - \"I would if I didn't use meth.\"   Nicotine - yes    6. What changes are you willing to make? What substance are you willing to stop using? How are you going to do that? Have you tried that before? What interfered with your success with that goal?      She wants residential treatment, like [facility] with sober living. She doesn't want [facility] because her sponsor said there is a lot of drug use there currently.     7. What would be helpful to you in making this change?     Treatment. She wants an emotional support dog.    Dimension IV Ratings   Readiness for Change - The placing authority must use the criteria in Dimension IV to determine a client s readiness for change.   RISK DESCRIPTIONS - Severity ratin Client is motivated with active reinforcement, to explore treatment and " "strategies for change, but ambivalent about illness or need for change.    REASONS SEVERITY WAS ASSIGNED - (What information did the person provide that supports your assessment of his or her readiness to change? How aware is the person of problems caused by continued use? How willing is she or he to make changes? What does the person feel would be helpful? What has the person been able to do without help?)      Patient expressed a desire to abstain from meth and all substances. She knows complete abstinence is best. She identified that she lied and wasn't ready to go to treatment in 04/2017. She is now ready and is going on her own volition.        DIMENSION V - Relapse, Continued Use, and Continued Problem Potential   1. In what ways have you tried to control, cut-down or quit your use? If you have had periods of sobriety, how did you accomplish that? What was helpful? What happened to prevent you from continuing your sobriety? (DSM)     She stated withdrawals commonly hit three weeks after she stops, and that's what causes her to resume use. In 2015, she lived down in a [facility] part of [place] and was able to maintain sobriety. She was also on a stay of commitment at that time.     2. Have you experienced cravings? If yes, ask follow up questions to determine if the person recognizes triggers and if the person has had any success in dealing with them.     Cravings: \"I've been craving pot, but I'm allergic to pot.\" She gets sweaty and stuffy nose from marijuana. \"It's alarming that I have no desire, even for the needle.\" In the past, she found she was fighting the addiction to the needle and seeing the blood rush up.     Prompting events/emotions: withdrawal, pain, availability, feelings. She reported weight gain is a trigger for meth use.    3. Have you been treated for alcohol/other drug abuse/dependence?     Age 18 - outpatient sober house at [facility]  Age 21 - inpatient treatment. She was pregnant with " her son and completed it before he was born.  Age 30 - she started [place], but didn't complete. She got out in 2015 and lost her lost her kids in 2015.   Age 30 - summer 2015, she did [facility] relapse prevention program  Age 31 - [facility] inpatient treatment for 45 days from 2016 to 2016. She was in withdrawal for days.    4. Support group participation: Have you/do you attend support group meetings to reduce/stop your alcohol/drug use? How recently? What was your experience? Are you willing to restart? If the person has not participated, is he or she willing?     She re-started NA on 2017. She goes  and  and some . She has a sponsor and is working on Step 2. She has never worked the Steps. In , she stayed sober for a year because of NA.    5. What would assist you in staying sober/straight?     Treatment. The first thing she does after treatment is look for someone for sex and she gets used by them.     Dimension V Ratings   Relapse/Continued Use/Continued problem potential - The placing authority must use the criteria in Dimension V to determine a client s relapse, continued use, and continued problem potential.   RISK DESCRIPTIONS - Severity ratin No awareness of the negative impact of mental health problems or substance abuse. No coping skills to arrest mental health or addiction illnesses, or prevent relapse.    REASONS SEVERITY WAS ASSIGNED - (What information did the person provide that indicates his or her understanding of relapse issues? What about the person s experience indicates how prone he or she is to relapse? What coping skills does the person have that decrease relapse potential?)      Patient reported 5 past treatments and only recent support group attendance. Patient has tried to quit using and drinking in the past but relapsed. Patient has knowledge of the addiction cycle, but lacks insight into her personal relapse process along with warning signs  "and triggers. Patient lacks insight into the effects her use has had on her physical and mental health. Patient lacks impulse control, sober coping skills, and long-term sober maintenance skills. Patient is at a high risk for relapse/continued use. Patient has co-occurring mental health and substance use issues, which places her at higher risk for relapse.        DIMENSION VI - Recovery Environment   1. Are you employed/attending school? Tell me about that.     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 Patient reported that alcohol has negatively impacted her work, but meth has not. Hobbies: \"workout and it's becoming an addiction.\" Read. Guitar. Creative things. She is scrapbooking her life of the before and now.         2A. Describe a typical day; evening for you. Work, school, social, leisure, volunteer, spiritual practices. Include time spent obtaining, using, recovering from drugs or alcohol. (DSM)     Wake up at 8am, go to doctor's appointments and physical therapy. She was able to do many things and was a functional meth user. Patient reported that coming down from meth prevented her from completing daily tasks    2B. How often do you spend more time than you planned using or use more than you planned? (DSM)     Every time.     3. How important is using to your social connections? Do many of your family or friends use?     \"I don't have friends.\" She is staying away from the ones who use.    4A. Are you currently in a significant relationship?     She got  in 2012 and they are still . They  in 2014.     4C. Sexual Orientation:     Bisexual    5A. Who do you live with?      Patient rents the basement from her roommate and her roommate's . She had lived with another roommate in 04/2017, but moved out after that roommate called the police for suicidal statements.       5B. Tell me about their " alcohol/drug use and mental health issues.     They drink, but not often. No drug use and are emotionally stable.     5C. Are you concerned for your safety there?     No    5D. Are you concerned about the safety of anyone else who lives with you?     No    6A. Do you have children who live with you?     No    6B. Do you have children who do not live with you?     Son age 11, daughter age 8, and daughter age 5. She terminated her rights last year and they are being adopted this year.     7A. Who supports you in making changes in your alcohol or drug use? What are they willing to do to support you? Who is upset or angry about you making changes in your alcohol or drug use? How big a problem is this for you?      Family    7B. This table is provided to record information about the person s relationships and available support It is not necessary to ask each item; only to get a comprehensive picture of their support system.  How often can you count on the following people when you need someone?   Partner / Spouse N/A   Parent(s)/Aunt(s)/Uncle(s)/Grandparents Usually and Always supportive   Sibling(s)/Cousin(s) N/A   Child(sammi) Always supportive   Other relative(s) Never supportive - she stated she doesn't have much of a relationship with her aunt and uncle. They are willing to send her photos of the children.    Friend(s)/neighbor(s) Usually supportive   Child(sammi) s father(s)/mother(s) Never supportive   Support group member(s) Always supportive   Community of gabrielle members N/A   /counselor/therapist/healer N/A   Other (specify) N/A     8A. What is your current living situation?     Patient rents the basement from her roommate and her roommate's .       8B. What is your long term plan for where you will be living?     Patient doesn't know. She wants sober housing after treatment.     8C. Tell me about your living environment/neighborhood? Ask enough follow up questions to determine safety, criminal  activity, availability of alcohol and drugs, supportive or antagonistic to the person making changes.      Patient reported his neighborhood is safe. Patient reported it not easy right now to get drugs     9. Criminal justice history: Gather current/recent history and any significant history related to substance use--Arrests? Convictions? Circumstances? Alcohol or drug involvement? Sentences? Still on probation or parole? Expectations of the court? Current court order? Any sex offenses - lifetime? What level? (DSM)    Theft - never arrested, but a got a ticket in 06/2017 from stealing from [employer].   Check fraud - age 18. She served 6 days in [facility].   Disorderly Conduct - 2003  Patient's 's license is currently revoked due to no insurance ticket from 01/2017.   Patient denied current legal involvement.    10. What obstacles exist to participating in treatment? (Time off work, childcare, funding, transportation, pending [facility] time, living situation)     None    Dimension VI Ratings   Recovery environment - The placing authority must use the criteria in Dimension VI to determine a client s recovery environment.   RISK DESCRIPTIONS - Severity rating: 3 Client is not engaged in structured, meaningful activity and the client s peers, family, significant other, and living environment are unsupportive, or there is significant criminal justice system involvement.    REASONS SEVERITY WAS ASSIGNED - (What support does the person have for making changes? What structure/stability does the person have in his or her daily life that will increase the likelihood that changes can be sustained? What problems exist in the person s environment that will jeopardize getting/staying clean and sober?)     Patient rents the basement from her roommate and her roommate's . Her current living situation is supportive towards recovery. Patient reported having relationship conflict with family due to her ongoing substance use. Patient  denied being in a significant relationship. Patient lacks an extensive current sober support network. Patient denied having any concerns regarding immediate living environment or neighborhood. Patient is unemployed and disabled, and lacks a daily structure and meaningful activities. Patient reported legal issues of check fraud and theft. Patient denied current legal involvement.        Client Choice/Exceptions   Would you like services specific to language, age, gender, culture, Sikhism preference, race, ethnicity, sexual orientation or disability?  Yes - trauma focused    What particular treatment choices and options would you like to have? 12-step based    Do you have a preference for a particular treatment program? [facility]    Criteria for Diagnosis     Criteria for Diagnosis  DSM-5 Criteria for Substance Use Disorder  Instructions: Determine whether the client currently meets the criteria for Substance Use Disorder using the diagnostic criteria in the DSM-V [id number].[phone]. 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    If court related records were reviewed, summarize here: NA    Rule 25 Assessment Summary and Plan   's Recommendation    It is recommended that patient:  1). Participate in and complete a co-occurring substance use treatment program with  lodging/residential.   2). Follow all subsequent recommendations of the substance use treatment providers. Discuss sober living options for aftercare.  3). Abstain from alcohol and all mood-altering substances, except as prescribed. Take all medications as prescribed.   4). Attend NA at least three weekly and continue to work with female sponsor for additional sober supports. Consider attending Al-Anon to address effects of alcohol in family of origin.  5). Become involved in a daily sober recreational activity/hobby of her own interest.  6). Have a mental health evaluation to determine accurate diagnoses and which psychotropic medications would be effective.   7). Begin weekly individual mental health therapy with a trauma-informed therapist.  8). Work with primary counselor to address suicidal ideation and complete the Patient Safety Plan.     Collateral Contacts     Name:    Colleen Curtis Relationship:    Mother   Phone Number:    [phone] Releases:    Yes       Criteria for Diagnosis       A problematic pattern of alcohol/drug use leading to clinically significant impairment or distress, as manifested by at least two of the following, occurring within a 12-month period: 11/11    Alcohol/drug is often taken in larger amounts or over a longer period than was intended.  There is a persistent desire or unsuccessful efforts to cut down or control alcohol/drug use  A great deal of time is spent in activities necessary to obtain alcohol, use alcohol, or recover from its effects.  Craving, or a strong desire or urge to use alcohol/drug  Recurrent alcohol/drug use resulting in a failure to fulfill major role obligations at work, school or home.  Continued alcohol use despite having persistent or recurrent social or interpersonal problems caused or exacerbated by the effects of alcohol/drug.  Important social, occupational, or recreational activities are given up or reduced because of alcohol/drug use.  Recurrent  alcohol/drug use in situations in which it is physically hazardous.  Alcohol/drug use is continued despite knowledge of having a persistent or recurrent physical or psychological problem that is likely to have been caused or exacerbated by alcohol.  Tolerance, as defined by either of the following: A need for markedly increased amounts of alcohol/drug to achieve intoxication or desired effect.  Withdrawal, as manifested by either of the following: The characteristic withdrawal syndrome for alcohol/drug (refer to Criteria A and B of the criteria set for alcohol/drug withdrawal).    Specify if: In early remission:  After full criteria for alcohol/drug use disorder were previously met, none of the criteria for alcohol/drug use disorder have been met for at least 3 months but for less than 12 months (with the exception that Criterion A4,  Craving or a strong desire or urge to use alcohol/drug  may be met).     In sustained remission:   After full criteria for alcohol use disorder were previously met, non of the criteria for alcohol/drug use disorder have been met at any time during a period of 12 months or longer (with the exception that Criterion A4,  Craving or strong desire or urge to use alcohol/drug  may be met).   Specify if:   This additional specifier is used if the individual is in an environment where access to alcohol is restricted.    Mild: Presence of 2-3 symptoms  Moderate: Presence of 4-5 symptoms  Severe: Presence of 6 or more symptoms

## 2017-07-26 ASSESSMENT — ANXIETY QUESTIONNAIRES: GAD7 TOTAL SCORE: 12

## 2017-07-26 ASSESSMENT — PATIENT HEALTH QUESTIONNAIRE - PHQ9: SUM OF ALL RESPONSES TO PHQ QUESTIONS 1-9: 14

## 2018-04-30 ENCOUNTER — HEALTH MAINTENANCE LETTER (OUTPATIENT)
Age: 33
End: 2018-04-30

## 2019-12-02 ENCOUNTER — MEDICAL CORRESPONDENCE (OUTPATIENT)
Dept: HEALTH INFORMATION MANAGEMENT | Facility: CLINIC | Age: 34
End: 2019-12-02

## 2021-04-03 ENCOUNTER — OFFICE VISIT (OUTPATIENT)
Dept: URGENT CARE | Facility: URGENT CARE | Age: 36
End: 2021-04-03
Payer: COMMERCIAL

## 2021-04-03 VITALS
SYSTOLIC BLOOD PRESSURE: 126 MMHG | TEMPERATURE: 99.5 F | OXYGEN SATURATION: 98 % | DIASTOLIC BLOOD PRESSURE: 86 MMHG | WEIGHT: 116 LBS | BODY MASS INDEX: 25.55 KG/M2 | HEART RATE: 101 BPM | RESPIRATION RATE: 20 BRPM

## 2021-04-03 DIAGNOSIS — N10 ACUTE PYELONEPHRITIS: Primary | ICD-10-CM

## 2021-04-03 DIAGNOSIS — N31.9 NEUROGENIC BLADDER: ICD-10-CM

## 2021-04-03 DIAGNOSIS — R30.0 DYSURIA: ICD-10-CM

## 2021-04-03 LAB
ALBUMIN UR-MCNC: >=300 MG/DL
APPEARANCE UR: ABNORMAL
BACTERIA #/AREA URNS HPF: ABNORMAL /HPF
BILIRUB UR QL STRIP: NEGATIVE
COLOR UR AUTO: YELLOW
GLUCOSE UR STRIP-MCNC: NEGATIVE MG/DL
HGB UR QL STRIP: ABNORMAL
KETONES UR STRIP-MCNC: NEGATIVE MG/DL
LEUKOCYTE ESTERASE UR QL STRIP: ABNORMAL
NITRATE UR QL: NEGATIVE
NON-SQ EPI CELLS #/AREA URNS LPF: ABNORMAL /LPF
PH UR STRIP: 6 PH (ref 5–7)
RBC #/AREA URNS AUTO: ABNORMAL /HPF
SOURCE: ABNORMAL
SP GR UR STRIP: 1.02 (ref 1–1.03)
UROBILINOGEN UR STRIP-ACNC: 0.2 EU/DL (ref 0.2–1)
WBC #/AREA URNS AUTO: ABNORMAL /HPF

## 2021-04-03 PROCEDURE — 99203 OFFICE O/P NEW LOW 30 MIN: CPT | Performed by: INTERNAL MEDICINE

## 2021-04-03 PROCEDURE — 87186 SC STD MICRODIL/AGAR DIL: CPT | Performed by: INTERNAL MEDICINE

## 2021-04-03 PROCEDURE — 87088 URINE BACTERIA CULTURE: CPT | Performed by: INTERNAL MEDICINE

## 2021-04-03 PROCEDURE — 81001 URINALYSIS AUTO W/SCOPE: CPT | Performed by: INTERNAL MEDICINE

## 2021-04-03 PROCEDURE — 87086 URINE CULTURE/COLONY COUNT: CPT | Performed by: INTERNAL MEDICINE

## 2021-04-03 RX ORDER — CIPROFLOXACIN 500 MG/1
500 TABLET, FILM COATED ORAL 2 TIMES DAILY
Qty: 14 TABLET | Refills: 0 | Status: SHIPPED | OUTPATIENT
Start: 2021-04-03 | End: 2021-04-10

## 2021-04-03 NOTE — PROGRESS NOTES
Assessment & Plan     Acute pyelonephritis  - ciprofloxacin (CIPRO) 500 MG tablet; Take 1 tablet (500 mg) by mouth 2 times daily for 7 days    Neurogenic bladder  - ciprofloxacin (CIPRO) 500 MG tablet; Take 1 tablet (500 mg) by mouth 2 times daily for 7 days    Dysuria  - *UA reflex to Microscopic and Culture ([facility] (except [place] [facility] [place])    Silver Pacheco MD  [facility]    Subjective     HPI   She is complaining of left sided back and pain and tenderness.  She is prone to UTI and pyelo.  She has a spinal cord injury which results in a neurogenic bladder and she self-caths.  Noting that urine is cloudy and increase in mucus. She is nauseated. Not vomiting.  She states that she typically does well with Cipro.  Primary care is through [facility].    Review of Systems   Constitutional, HEENT, cardiovascular, pulmonary, gi and gu systems are negative, except as otherwise noted.      Objective    /86   Pulse 101   Temp 99.5  F (37.5  C)   Resp 20   Wt 52.6 kg (116 lb)   LMP  (LMP Unknown)   SpO2 98%   BMI 25.55 kg/m    Body mass index is 25.55 kg/m .  Physical Exam   GENERAL APPEARANCE: healthy, alert and no distress  RESP: lungs clear to auscultation - no rales, rhonchi or wheezes  CV: regular rates and rhythm, normal S1 S2, no S3 or S4 and no murmur, click or rub  BACK: left CVA tenderness     Results for orders placed or performed in visit on 04/03/21 (from the past 24 hour(s))   *UA reflex to Microscopic and Culture ([facility] (except [place] and [place])    Specimen: Catheterized Urine   Result Value Ref Range    Color Urine Yellow     Appearance Urine Cloudy     Glucose Urine Negative NEG^Negative mg/dL    Bilirubin Urine Negative NEG^Negative    Ketones Urine Negative NEG^Negative mg/dL    Specific Gravity Urine 1.020 1.003 - 1.035    Blood Urine Large (A) NEG^Negative    pH Urine 6.0 5.0 - 7.0 pH    Protein Albumin  Urine >=300 (A) NEG^Negative mg/dL    Urobilinogen Urine 0.2 0.2 - 1.0 EU/dL    Nitrite Urine Negative NEG^Negative    Leukocyte Esterase Urine Large (A) NEG^Negative    Source Catheterized Urine    Urine Microscopic   Result Value Ref Range    WBC Urine  (A) OTO5^0 - 5 /HPF    RBC Urine 10-25 (A) OTO2^O - 2 /HPF    Squamous Epithelial /LPF Urine Few FEW^Few /LPF    Bacteria Urine Few (A) NEG^Negative /HPF

## 2021-04-03 NOTE — PATIENT INSTRUCTIONS
Let us know if symptoms do not improve with the cipro.  We'll culture the urine.  If we see any resistant organisms, we'll call you.  Otherwise, if everything looks good with the culture, we won't call.  No news is good news.

## 2021-04-04 LAB
BACTERIA SPEC CULT: ABNORMAL
Lab: ABNORMAL
SPECIMEN SOURCE: ABNORMAL

## 2021-10-29 ENCOUNTER — OFFICE VISIT (OUTPATIENT)
Dept: URGENT CARE | Facility: URGENT CARE | Age: 36
End: 2021-10-29
Payer: COMMERCIAL

## 2021-10-29 VITALS
HEART RATE: 109 BPM | DIASTOLIC BLOOD PRESSURE: 66 MMHG | SYSTOLIC BLOOD PRESSURE: 115 MMHG | RESPIRATION RATE: 16 BRPM | TEMPERATURE: 98.6 F | OXYGEN SATURATION: 99 %

## 2021-10-29 DIAGNOSIS — N10 PYELONEPHRITIS, ACUTE: Primary | ICD-10-CM

## 2021-10-29 DIAGNOSIS — R30.0 DYSURIA: ICD-10-CM

## 2021-10-29 DIAGNOSIS — N31.9 NEUROGENIC BLADDER: ICD-10-CM

## 2021-10-29 LAB
ALBUMIN UR-MCNC: 100 MG/DL
APPEARANCE UR: CLEAR
BACTERIA #/AREA URNS HPF: ABNORMAL /HPF
BILIRUB UR QL STRIP: NEGATIVE
COLOR UR AUTO: YELLOW
GLUCOSE UR STRIP-MCNC: NEGATIVE MG/DL
HGB UR QL STRIP: ABNORMAL
KETONES UR STRIP-MCNC: NEGATIVE MG/DL
LEUKOCYTE ESTERASE UR QL STRIP: ABNORMAL
NITRATE UR QL: NEGATIVE
PH UR STRIP: 6 [PH] (ref 5–7)
RBC #/AREA URNS AUTO: >100 /HPF
SP GR UR STRIP: 1.02 (ref 1–1.03)
SQUAMOUS #/AREA URNS AUTO: ABNORMAL /LPF
UROBILINOGEN UR STRIP-ACNC: 0.2 E.U./DL
WBC #/AREA URNS AUTO: ABNORMAL /HPF
WBC CLUMPS #/AREA URNS HPF: PRESENT /HPF

## 2021-10-29 PROCEDURE — 96372 THER/PROPH/DIAG INJ SC/IM: CPT | Performed by: FAMILY MEDICINE

## 2021-10-29 PROCEDURE — 87086 URINE CULTURE/COLONY COUNT: CPT | Performed by: FAMILY MEDICINE

## 2021-10-29 PROCEDURE — 99214 OFFICE O/P EST MOD 30 MIN: CPT | Mod: 25 | Performed by: FAMILY MEDICINE

## 2021-10-29 PROCEDURE — 81001 URINALYSIS AUTO W/SCOPE: CPT

## 2021-10-29 RX ORDER — CIPROFLOXACIN 500 MG/1
500 TABLET, FILM COATED ORAL 2 TIMES DAILY
Qty: 20 TABLET | Refills: 0 | Status: SHIPPED | OUTPATIENT
Start: 2021-10-29 | End: 2021-11-08

## 2021-10-29 RX ORDER — CEFTRIAXONE SODIUM 1 G
1 VIAL (EA) INJECTION ONCE
Status: COMPLETED | OUTPATIENT
Start: 2021-10-29 | End: 2021-10-29

## 2021-10-29 RX ADMIN — Medication 1 G: at 14:16

## 2021-10-29 NOTE — PROGRESS NOTES
SUBJECTIVE: Dalila Rosario is a 36 year old female who  presents today for a possible UTI.   Symptoms of dysuria and back pain have been going on forday(s).    Hematuria no.  still presentand moderate.    There is no history of fever, chills, nausea or vomiting.   This patient does have a history of urinary tract infections.   Patient denies long duration and flank pain    Past Medical History:   Diagnosis Date     Anxiety      Attempted suicide (H)      History of blood transfusion      Major depression      Myelodysplasia of the spinal cord (H)      OCD (obsessive compulsive disorder)      Other chronic pain      Allergies   Allergen Reactions     Droperidol Anaphylaxis     Lidocaine      Other reaction(s): Seizures - was told by allergist that she is not allergic and had reaction due to quantity of lido that she received.      Benzoin Tincture [Benzoin] Blisters     Vancomycin Other (See Comments)     Other reaction(s): Other (see comments), Chaz Syndrome  Slowed rate down and given benadryl.  Slowed rate down and given benadryl.  Slowed rate down and given benadryl.  Slowed rate down and given benadryl.       Social History     Tobacco Use     Smoking status: Current Some Day Smoker     Packs/day: 0.50     Types: Cigarettes     Smokeless tobacco: Never Used   Substance Use Topics     Alcohol use: Yes     Alcohol/week: 0.0 standard drinks     Comment: not often       ROS: CONSTITUTIONAL:NEGATIVE for fever, chills, change in weight    OBJECTIVE:  /66   Pulse 109   Temp 98.6  F (37  C) (Tympanic)   Resp 16   SpO2 99%     No Flank/abd pain      ICD-10-CM    1. Pyelonephritis, acute  N10 cefTRIAXone (ROCEPHIN) injection 1 g     ciprofloxacin (CIPRO) 500 MG tablet   2. Dysuria  R30.0 UA macro with reflex to Microscopic and Culture - [facility] Collect     Urine Microscopic Exam     Urine Culture   3. Neurogenic bladder  N31.9      Drink plenty of fluids.  Prevention and treatment of UTI's discussed.Signs  and symptoms of pyelonephritis mentioned.  Follow up with primary care physician if not improving

## 2021-10-29 NOTE — PATIENT INSTRUCTIONS
Patient Education     Discharge Instructions for Pyelonephritis  You have been told you have a kidney infection. This is called pyelonephritis. The infection can be serious. It can damage your kidneys and cause bacteria to enter your bloodstream. You were treated in the hospital. Once you return home, here s what you can do at home to aid in your recovery and prevent future infections.   Home care    Take all the medicine you were prescribed, even if you feel better. Not finishing the medicine can make the infection come back. It may also make a future infection harder to treat.    Unless told not to by your healthcare provider, drink 8 to 12 glasses of fluid every day. Clear fluids, such as water, are best. This may help flush the infection from your system.    Preventing future infection    Keep your genital area clean. Use mild soap. Rinse with water.    If you are a woman, always wipe the genital area from front to back.    Urinate frequently. Don't hold urine in your bladder for a long time.    Always urinate after having sex.    Practice safe sex. Protect yourself and your partner from sexually transmitted infections (STIs).    Follow-up care  Follow up with your healthcare provider, or as advised. And see your healthcare provider for regular lab tests as directed.   When to call your healthcare provider  Call your healthcare provider right away if you have any of the following:    Decreased urine output or trouble urinating    Severe pain in the lower back or flank    Fever of 100.4 F (38 C) or higher, or as directed by your healthcare provider    Shaking chills    Vomiting    Blood in your urine    Dark-colored or foul-smelling urine    Nausea or other problems that prevent you from taking your prescribed medicine    New or worsening symptoms  [employer] last reviewed this educational content on 4/1/2020 2000-2021 [employer]. All rights reserved. This information is not intended as a  substitute for professional medical care. Always follow your healthcare professional's instructions.

## 2021-10-29 NOTE — PROGRESS NOTES
Clinic Administered Medication Documentation    Administrations This Visit     cefTRIAXone (ROCEPHIN) injection 1 g     Admin Date  10/29/2021 Action  Given Dose  1 g Route  Intramuscular Site  Right Ventrogluteal Administered By  Camelia Herbert, DARÍO    Ordering Provider: Shubham Camarena, DO    Patient Supplied?: No

## 2021-10-30 LAB — BACTERIA UR CULT: NORMAL

## 2021-11-02 ENCOUNTER — HOSPITAL ENCOUNTER (OUTPATIENT)
Facility: CLINIC | Age: 36
Setting detail: OBSERVATION
Discharge: HOME | End: 2021-11-04
Attending: EMERGENCY MEDICINE | Admitting: EMERGENCY MEDICINE
Payer: COMMERCIAL

## 2021-11-02 DIAGNOSIS — T50.902A POLYSUBSTANCE OVERDOSE, INTENTIONAL SELF-HARM, INITIAL ENCOUNTER (H): ICD-10-CM

## 2021-11-02 DIAGNOSIS — F33.2 SEVERE EPISODE OF RECURRENT MAJOR DEPRESSIVE DISORDER, WITHOUT PSYCHOTIC FEATURES (H): Primary | ICD-10-CM

## 2021-11-02 DIAGNOSIS — F41.1 GENERALIZED ANXIETY DISORDER: ICD-10-CM

## 2021-11-02 DIAGNOSIS — F33.2 MAJOR DEPRESSIVE DISORDER, RECURRENT, SEVERE WITHOUT PSYCHOTIC FEATURES (H): ICD-10-CM

## 2021-11-02 DIAGNOSIS — G47.00 INSOMNIA, UNSPECIFIED TYPE: ICD-10-CM

## 2021-11-02 DIAGNOSIS — T14.91XA SUICIDE ATTEMPT (H): ICD-10-CM

## 2021-11-02 PROBLEM — F32.A DEPRESSION, UNSPECIFIED DEPRESSION TYPE: Status: ACTIVE | Noted: 2021-11-02

## 2021-11-02 LAB
ALBUMIN SERPL-MCNC: 3.3 G/DL (ref 3.4–5)
ALP SERPL-CCNC: 65 U/L (ref 40–150)
ALT SERPL W P-5'-P-CCNC: 23 U/L (ref 0–50)
AMPHETAMINES UR QL SCN: NORMAL
ANION GAP SERPL CALCULATED.3IONS-SCNC: 7 MMOL/L (ref 3–14)
APAP SERPL-MCNC: <2 MG/L (ref 10–30)
AST SERPL W P-5'-P-CCNC: 15 U/L (ref 0–45)
BARBITURATES UR QL: NORMAL
BASE EXCESS BLDV CALC-SCNC: -1.8 MMOL/L (ref -7.7–1.9)
BASOPHILS # BLD AUTO: 0.1 10E3/UL (ref 0–0.2)
BASOPHILS NFR BLD AUTO: 1 %
BENZODIAZ UR QL: NORMAL
BILIRUB SERPL-MCNC: 0.9 MG/DL (ref 0.2–1.3)
BUN SERPL-MCNC: 7 MG/DL (ref 7–30)
CALCIUM SERPL-MCNC: 8.2 MG/DL (ref 8.5–10.1)
CANNABINOIDS UR QL SCN: NORMAL
CHLORIDE BLD-SCNC: 111 MMOL/L (ref 94–109)
CO2 SERPL-SCNC: 21 MMOL/L (ref 20–32)
COCAINE UR QL: NORMAL
CREAT SERPL-MCNC: 0.54 MG/DL (ref 0.52–1.04)
EOSINOPHIL # BLD AUTO: 0 10E3/UL (ref 0–0.7)
EOSINOPHIL NFR BLD AUTO: 1 %
ERYTHROCYTE [DISTWIDTH] IN BLOOD BY AUTOMATED COUNT: 17.7 % (ref 10–15)
ETHANOL SERPL-MCNC: <0.01 G/DL
GFR SERPL CREATININE-BSD FRML MDRD: >90 ML/MIN/1.73M2
GLUCOSE BLD-MCNC: 113 MG/DL (ref 70–99)
HCG SERPL QL: NEGATIVE
HCO3 BLDV-SCNC: 24 MMOL/L (ref 21–28)
HCT VFR BLD AUTO: 34.3 % (ref 35–47)
HGB BLD-MCNC: 10.3 G/DL (ref 11.7–15.7)
HOLD SPECIMEN: NORMAL
IMM GRANULOCYTES # BLD: 0 10E3/UL
IMM GRANULOCYTES NFR BLD: 0 %
LYMPHOCYTES # BLD AUTO: 1.1 10E3/UL (ref 0.8–5.3)
LYMPHOCYTES NFR BLD AUTO: 21 %
MCH RBC QN AUTO: 24.3 PG (ref 26.5–33)
MCHC RBC AUTO-ENTMCNC: 30 G/DL (ref 31.5–36.5)
MCV RBC AUTO: 81 FL (ref 78–100)
MONOCYTES # BLD AUTO: 0.5 10E3/UL (ref 0–1.3)
MONOCYTES NFR BLD AUTO: 8 %
NEUTROPHILS # BLD AUTO: 3.8 10E3/UL (ref 1.6–8.3)
NEUTROPHILS NFR BLD AUTO: 69 %
NRBC # BLD AUTO: 0 10E3/UL
NRBC BLD AUTO-RTO: 0 /100
O2/TOTAL GAS SETTING VFR VENT: 2 %
OPIATES UR QL SCN: NORMAL
PCO2 BLDV: 44 MM HG (ref 40–50)
PCP UR QL SCN: NORMAL
PH BLDV: 7.35 [PH] (ref 7.32–7.43)
PLATELET # BLD AUTO: 438 10E3/UL (ref 150–450)
PO2 BLDV: 33 MM HG (ref 25–47)
POTASSIUM BLD-SCNC: 3.4 MMOL/L (ref 3.4–5.3)
PROT SERPL-MCNC: 7 G/DL (ref 6.8–8.8)
RBC # BLD AUTO: 4.23 10E6/UL (ref 3.8–5.2)
SALICYLATES SERPL-MCNC: <2 MG/DL
SARS-COV-2 RNA RESP QL NAA+PROBE: NEGATIVE
SODIUM SERPL-SCNC: 139 MMOL/L (ref 133–144)
WBC # BLD AUTO: 5.5 10E3/UL (ref 4–11)

## 2021-11-02 PROCEDURE — C9803 HOPD COVID-19 SPEC COLLECT: HCPCS

## 2021-11-02 PROCEDURE — 80307 DRUG TEST PRSMV CHEM ANLYZR: CPT | Performed by: EMERGENCY MEDICINE

## 2021-11-02 PROCEDURE — 258N000003 HC RX IP 258 OP 636: Performed by: EMERGENCY MEDICINE

## 2021-11-02 PROCEDURE — 80143 DRUG ASSAY ACETAMINOPHEN: CPT | Performed by: EMERGENCY MEDICINE

## 2021-11-02 PROCEDURE — 99285 EMERGENCY DEPT VISIT HI MDM: CPT | Mod: 25

## 2021-11-02 PROCEDURE — 82803 BLOOD GASES ANY COMBINATION: CPT | Performed by: EMERGENCY MEDICINE

## 2021-11-02 PROCEDURE — G0378 HOSPITAL OBSERVATION PER HR: HCPCS

## 2021-11-02 PROCEDURE — 250N000013 HC RX MED GY IP 250 OP 250 PS 637: Performed by: NURSE PRACTITIONER

## 2021-11-02 PROCEDURE — 96360 HYDRATION IV INFUSION INIT: CPT

## 2021-11-02 PROCEDURE — 80053 COMPREHEN METABOLIC PANEL: CPT | Performed by: EMERGENCY MEDICINE

## 2021-11-02 PROCEDURE — 80179 DRUG ASSAY SALICYLATE: CPT | Performed by: EMERGENCY MEDICINE

## 2021-11-02 PROCEDURE — 36415 COLL VENOUS BLD VENIPUNCTURE: CPT | Performed by: EMERGENCY MEDICINE

## 2021-11-02 PROCEDURE — 84703 CHORIONIC GONADOTROPIN ASSAY: CPT | Performed by: EMERGENCY MEDICINE

## 2021-11-02 PROCEDURE — 99220 PR INITIAL OBSERVATION CARE,LEVEL III: CPT | Performed by: NURSE PRACTITIONER

## 2021-11-02 PROCEDURE — 87635 SARS-COV-2 COVID-19 AMP PRB: CPT | Performed by: EMERGENCY MEDICINE

## 2021-11-02 PROCEDURE — 96361 HYDRATE IV INFUSION ADD-ON: CPT

## 2021-11-02 PROCEDURE — 85025 COMPLETE CBC W/AUTO DIFF WBC: CPT | Performed by: EMERGENCY MEDICINE

## 2021-11-02 PROCEDURE — 93005 ELECTROCARDIOGRAM TRACING: CPT

## 2021-11-02 PROCEDURE — 82077 ASSAY SPEC XCP UR&BREATH IA: CPT | Performed by: EMERGENCY MEDICINE

## 2021-11-02 RX ORDER — GABAPENTIN 400 MG/1
800 CAPSULE ORAL AT BEDTIME
COMMUNITY

## 2021-11-02 RX ORDER — GABAPENTIN 400 MG/1
400 CAPSULE ORAL 2 TIMES DAILY
COMMUNITY

## 2021-11-02 RX ORDER — GABAPENTIN 400 MG/1
400 CAPSULE ORAL 2 TIMES DAILY
Status: DISCONTINUED | OUTPATIENT
Start: 2021-11-03 | End: 2021-11-04 | Stop reason: HOSPADM

## 2021-11-02 RX ORDER — DOXEPIN HYDROCHLORIDE 25 MG/1
25 CAPSULE ORAL AT BEDTIME
Status: DISCONTINUED | OUTPATIENT
Start: 2021-11-02 | End: 2021-11-03

## 2021-11-02 RX ORDER — GABAPENTIN 400 MG/1
800 CAPSULE ORAL AT BEDTIME
Status: DISCONTINUED | OUTPATIENT
Start: 2021-11-02 | End: 2021-11-04 | Stop reason: HOSPADM

## 2021-11-02 RX ORDER — QUETIAPINE FUMARATE 25 MG/1
25-50 TABLET, FILM COATED ORAL AT BEDTIME
Status: DISCONTINUED | OUTPATIENT
Start: 2021-11-02 | End: 2021-11-03

## 2021-11-02 RX ORDER — SODIUM CHLORIDE 9 MG/ML
INJECTION, SOLUTION INTRAVENOUS CONTINUOUS
Status: DISCONTINUED | OUTPATIENT
Start: 2021-11-02 | End: 2021-11-04 | Stop reason: HOSPADM

## 2021-11-02 RX ADMIN — SODIUM CHLORIDE 1000 ML: 9 INJECTION, SOLUTION INTRAVENOUS at 14:48

## 2021-11-02 RX ADMIN — MIRTAZAPINE 45 MG: 30 TABLET, FILM COATED ORAL at 22:49

## 2021-11-02 RX ADMIN — GABAPENTIN 800 MG: 400 CAPSULE ORAL at 22:49

## 2021-11-02 RX ADMIN — QUETIAPINE FUMARATE 25 MG: 25 TABLET ORAL at 22:49

## 2021-11-02 ASSESSMENT — MIFFLIN-ST. JEOR: SCORE: 1062.38

## 2021-11-02 ASSESSMENT — ENCOUNTER SYMPTOMS: NAUSEA: 1

## 2021-11-02 NOTE — PHARMACY
Poison Control Consult    PharmD called Poison Control regarding methocarbamol and hydroxyzine overdose.    The reported ingestion was at noon, so methocarbamol and hydroxyzine have already peaked.      Poison Control had the following recommendations:    1. Continue to monitor for 4 hours.  Patient vital signs should continue to improve.   2.  Check ethanol, acetaminophen and salicylate levels.     Poison control will continue to follow.

## 2021-11-02 NOTE — ED NOTES
Bed: ED18  Expected date:   Expected time:   Means of arrival:   Comments:  Chantelle - 5365 - 36 F overdose eta 1400

## 2021-11-02 NOTE — ED PROVIDER NOTES
"  History     Chief Complaint:  Drug Overdose and Suicidal     The history is provided by the patient.      Dalila Rosario is a 36 year old female with history of spina bifida, substance abuse, suicide attempt who presents after ingesting twenty 25 mg hydroxyzine tablets and twenty 750 mg methocarbamol tablets approximately 3 hours ago as a suicide attempt. She wrote two suicide notes as well. She states that she was born with a spinal cord injury and had three surgeries on her left leg this year. She reports that she uses a wheelchair at baseline and is in \"constant pain.\" Dalila states that she has attempted suicide in the past, in 2015, with Valium and alcohol. She denies using alcohol or drugs for 4 years. Here in the ED, she reports that she feels groggy and mildly nauseated.    Review of Systems   Constitutional:        Grogginess +   Gastrointestinal: Positive for nausea.   Psychiatric/Behavioral:        Ingestion +   All other systems reviewed and are negative.    Allergies:  Droperidol  Lidocaine  Benzoin  Vancomycin  Adhesive    Medications:  Wellbutrin  Cipro  Gabapentin  Remeron  Hydroxyzine  Prazosin  Doxepin  Flexeril  Hiprex  Omeprazole    Past Medical History:     Anxiety  Suicide attempt  Depression  Blood transfusion  Myelodysplasia of the spinal cord  OCD  Chronic pain  Insomnia  Metrorrhagia  Neurogenic bladder  Hypertension  Scoliosis and kyphoscoliosis  Chemical dependency  Bacterial infection of knee joint  Dysplasia of hip  MRSA  GERD  Kidney stones  Tobacco use  Pyelonephritis  Spina bifida  Alcohol use disorder  Cannabis use disorder  Non-traumatic rhabdomyolysis  Methamphetamine abuse    Past Surgical History:    Leg amputation, left, above knee  Left leg stump revision  Knee arthroplasty revision    section x3  Irrigation and debridement of knee  Bladder surgery  Hip surgery  Spinal exploration surgery  Tubal ligation    Family History:    Mother: depression, " "emphysema  Father: substance abuse, testicular cancer  Brother: depression, substance abuse, ADD, asthma  Daughter: depression, anxiety   Son: anxiety    Social History:  Presents alone.  Presents via EMS.    Physical Exam     Patient Vitals for the past 24 hrs:   BP Temp Temp src Pulse Resp SpO2 Height Weight   11/02/21 1800 133/86 98.6  F (37  C) Oral 93 14 99 % 1.346 m (4' 5\") 56.7 kg (125 lb)   11/02/21 1730 116/84 -- -- 98 10 99 % -- --   11/02/21 1700 (!) 130/102 -- -- 99 14 100 % -- --   11/02/21 1630 (!) 130/101 -- -- 104 12 100 % -- --   11/02/21 1600 (!) 147/100 -- -- 103 16 99 % -- --   11/02/21 1515 -- -- -- -- -- 98 % -- --   11/02/21 1500 (!) 145/112 -- -- 114 -- 98 % -- --   11/02/21 1440 (!) 142/106 -- -- -- -- 98 % -- --   11/02/21 1419 (!) 149/102 97.6  F (36.4  C) Temporal (!) 133 20 98 % -- --       Physical Exam  General: Alert, interactive in distress. Flat affect. Tearful.  Head:  Scalp is atraumatic  Eyes:  The pupils are equal, round, and reactive to light    EOM's intact    No scleral icterus  ENT:      Nose:  The external nose is normal  Ears:  External ears are normal  Mouth/Throat: The oropharynx is normal    Mucus membranes are moist      Neck:  Normal range of motion.      There is no rigidity.    Trachea is in the midline         CV:  Regular rhythm. Tachycardia.    No murmur   Resp:  Breath sounds are clear bilaterally    Non-labored, no retractions or accessory muscle use      GI:  Abdomen is soft, no distension, no tenderness.       MS:  Normal strength in all 4 extremities    Left lower extremity at-knee amputation  Skin:  Warm and dry, No rash or lesions noted.  Neuro: Strength 5/5 x4.       GCS: 15  Psych:  Awake. Alert.  Flat affect.      Appropriate interactions.    Ongoing suicidal ideation.    Emergency Department Course     ECG  ECG taken at 1429, ECG read at 1450  Sinus tachycardia  Possible left atrial enlargement  Cannot rule out anterior infarct, age " undetermined  Abnormal ECG  Rate 111 bpm. WA interval 152 ms. QRS duration 78 ms. QT/QTc 334/454 ms. P-R-T axes 60 39 27.     Laboratory:  Labs Ordered and Resulted from Time of ED Arrival to Time of ED Departure   CBC WITH PLATELETS AND DIFFERENTIAL - Abnormal       Result Value    WBC Count 5.5      RBC Count 4.23      Hemoglobin 10.3 (*)     Hematocrit 34.3 (*)     MCV 81      MCH 24.3 (*)     MCHC 30.0 (*)     RDW 17.7 (*)     Platelet Count 438      % Neutrophils 69      % Lymphocytes 21      % Monocytes 8      % Eosinophils 1      % Basophils 1      % Immature Granulocytes 0      NRBCs per 100 WBC 0      Absolute Neutrophils 3.8      Absolute Lymphocytes 1.1      Absolute Monocytes 0.5      Absolute Eosinophils 0.0      Absolute Basophils 0.1      Absolute Immature Granulocytes 0.0      Absolute NRBCs 0.0     ACETAMINOPHEN LEVEL - Abnormal    Acetaminophen <2 (*)    COMPREHENSIVE METABOLIC PANEL - Abnormal    Sodium 139      Potassium 3.4      Chloride 111 (*)     Carbon Dioxide (CO2) 21      Anion Gap 7      Urea Nitrogen 7      Creatinine 0.54      Calcium 8.2 (*)     Glucose 113 (*)     Alkaline Phosphatase 65      AST 15      ALT 23      Protein Total 7.0      Albumin 3.3 (*)     Bilirubin Total 0.9      GFR Estimate >90     HCG QUALITATIVE PREGNANCY - Normal    hCG Serum Qualitative Negative     COVID-19 VIRUS (CORONAVIRUS) BY PCR - Normal    SARS CoV2 PCR Negative     SALICYLATE LEVEL - Normal    Salicylate <2     ETHYL ALCOHOL LEVEL - Normal    Alcohol ethyl <0.01     DRUG ABUSE SCREEN 77 URINE (FL, RH, SH) - Normal    Amphetamines Urine Screen Negative      Barbiturates Urine Screen Negative      Benzodiazepines Urine Screen Negative      Cannabinoids Urine Screen Negative      Cocaine Urine Screen Negative      Opiates Urine Screen Negative      PCP Urine Screen Negative     BLOOD GAS VENOUS    pH Venous 7.35      pCO2 Venous 44      pO2 Venous 33      Bicarbonate Venous 24      Base Excess/Deficit  (+/-) -1.8      FIO2 2       Emergency Department Course:  Reviewed:  I reviewed nursing notes, vitals, past medical history, Care Everywhere and [facility]    Assessments:  1451 I obtained history and examined the patient as noted above. Clinical pharmacist is calling poison control and nurse has placed the patient on an HORTENSIA. Poison control recommends monitoring the patient for 4 hours after ingestion of the drugs.  1651 I rechecked the patient and explained findings.     Interventions:  1448 NS 1 L IV    Disposition:  The patient was transferred to [facility].     Impression & Plan     Medical Decision Making:  Dalila Rosario is a 36 year old female who was seen and evaluated. She took an overdose of hydroxyzine as well as methocarbamol. Poison control was contacted and recommends 4 hours of observation which was undertaken in the emergency department without incident. Patient's laboratory work was reassuring. There are no signs of co-ingestion. Given the patient's mental health struggles, I believe she would benefit from evaluation in out [facility] unit and possible psychiatric admission. Patient was medically cleared in the ED and then subsequently transferred over to the [facility] unit for further evaluation and treatment.    Diagnosis:    ICD-10-CM    1. Polysubstance overdose, intentional self-harm, initial encounter (H)  T50.902A    2. Suicide attempt (H)  T14.91XA        Scribe Disclosure:  I, Donna Sherman, am serving as a scribe at 2:57 PM on 11/2/2021 to document services personally performed by Shaheen Saleh MD based on my observations and the provider's statements to me.      Shaheen Saleh MD  11/02/21 [id number]

## 2021-11-02 NOTE — ED TRIAGE NOTES
Patient took 20 tablet of Hydroxyzine and 20 tablets of methocarbamol approx around 1230pm in an attempt to end her life. Hx of drug use and suicide years ago per patient. Patient wrote suicide notes.

## 2021-11-02 NOTE — PROGRESS NOTES
S D is a 36 year old female with history of spinal befida received from ED due to Suicide attempt by overdosing on 20 tabs of 25 mg of Hydroxyzine and twenty 750 mg of Methocarbamol. Reports being depressed from dependency after losing her right hib that was supportive for her mobility, she is frustrated from taking multiple medications without relief, Pt admits SI but denies HI. Pt is A/O x 3, anxious and sad of what she did, denies Voices nor delusions. Pt admits of Vaping but denies alcohol since 2017. Pt s a left foot amputee and kidney failure and is able to self straight cath. Nursing and risk assessments completed. Assessments reviewed with LMHP and physician. Video monitoring in progress, patient informed.  Admission information reviewed with patient. Patient given a tour of [facility] and instructions on using the facility. Questions regarding [facility] addressed. Pt search completed and belongings inventoried.

## 2021-11-03 PROBLEM — F41.1 GENERALIZED ANXIETY DISORDER: Status: ACTIVE | Noted: 2021-11-03

## 2021-11-03 PROCEDURE — 99226 PR SUBSEQUENT OBSERVATION CARE,LEVEL III: CPT | Performed by: PSYCHIATRY & NEUROLOGY

## 2021-11-03 PROCEDURE — G0378 HOSPITAL OBSERVATION PER HR: HCPCS

## 2021-11-03 PROCEDURE — 250N000013 HC RX MED GY IP 250 OP 250 PS 637: Performed by: NURSE PRACTITIONER

## 2021-11-03 PROCEDURE — 250N000013 HC RX MED GY IP 250 OP 250 PS 637: Performed by: PSYCHIATRY & NEUROLOGY

## 2021-11-03 RX ORDER — QUETIAPINE FUMARATE 50 MG/1
50 TABLET, FILM COATED ORAL AT BEDTIME
Status: DISCONTINUED | OUTPATIENT
Start: 2021-11-03 | End: 2021-11-04 | Stop reason: HOSPADM

## 2021-11-03 RX ORDER — DOXEPIN HYDROCHLORIDE 10 MG/1
10 CAPSULE ORAL AT BEDTIME
Status: DISCONTINUED | OUTPATIENT
Start: 2021-11-03 | End: 2021-11-04 | Stop reason: HOSPADM

## 2021-11-03 RX ORDER — ACETAMINOPHEN 500 MG
500 TABLET ORAL EVERY 6 HOURS PRN
Status: DISCONTINUED | OUTPATIENT
Start: 2021-11-03 | End: 2021-11-04 | Stop reason: HOSPADM

## 2021-11-03 RX ORDER — METHOCARBAMOL 750 MG/1
750 TABLET, FILM COATED ORAL 3 TIMES DAILY PRN
Status: DISCONTINUED | OUTPATIENT
Start: 2021-11-03 | End: 2021-11-04 | Stop reason: HOSPADM

## 2021-11-03 RX ADMIN — NICOTINE POLACRILEX 2 MG: 2 GUM, CHEWING ORAL at 17:27

## 2021-11-03 RX ADMIN — METHOCARBAMOL 750 MG: 750 TABLET ORAL at 22:59

## 2021-11-03 RX ADMIN — DOXEPIN HYDROCHLORIDE 25 MG: 25 CAPSULE ORAL at 01:21

## 2021-11-03 RX ADMIN — NICOTINE POLACRILEX 4 MG: 2 GUM, CHEWING ORAL at 13:46

## 2021-11-03 RX ADMIN — QUETIAPINE FUMARATE 50 MG: 50 TABLET ORAL at 21:23

## 2021-11-03 RX ADMIN — MIRTAZAPINE 45 MG: 30 TABLET, FILM COATED ORAL at 21:23

## 2021-11-03 RX ADMIN — GABAPENTIN 400 MG: 400 CAPSULE ORAL at 08:57

## 2021-11-03 RX ADMIN — GABAPENTIN 400 MG: 400 CAPSULE ORAL at 13:21

## 2021-11-03 RX ADMIN — DOXEPIN HYDROCHLORIDE 10 MG: 10 CAPSULE ORAL at 21:23

## 2021-11-03 RX ADMIN — BUPROPION HYDROCHLORIDE 450 MG: 300 TABLET, EXTENDED RELEASE ORAL at 08:57

## 2021-11-03 RX ADMIN — ACETAMINOPHEN 500 MG: 500 TABLET, FILM COATED ORAL at 13:46

## 2021-11-03 RX ADMIN — GABAPENTIN 800 MG: 400 CAPSULE ORAL at 21:22

## 2021-11-03 NOTE — ED NOTES
Pt is awake now, slept all night until 0900 this morning. Pt straight cath self and ate her breakfast, denies any suicide ideation. Continues to rest in the sensory room C.

## 2021-11-03 NOTE — ED NOTES
All meds given except doxepin that was not available yet, pharmacy messaged. NOC informed top follow up.

## 2021-11-03 NOTE — ED NOTES
Pt will remain on Observation status tonight for reevaluation tomorrow. Calm and cooperative and meds compliant. Straight catheters in the nursing station for patient as needed. Pt will straight cather herself in the bathroom.

## 2021-11-03 NOTE — ED NOTES
[facility] Reassessment and Progress Note    Client Name: Dalila Rosario  Date: November 3, 2021       Presenting issue that brought patient to the [facility] unit: Pt transferred to the [facility] unit following a suicide attempt by intentional overdose.  She reports increased depression and anxiety due to multiple stressors.    Current presentation on the unit: Pt is calm and cooperative upon reassessment.  She reports sleeping extremely well and feeling better.    Current risk to self or others? No    Summary of therapeutic interventions completed with patient: The most therapeutic intervention for the patient has been getting more sleep as she has greatly struggled with this recently.    Treatment objectives addressed in this session:  Pt has not yet addressed treatment plan goals as she has been focusing on getting sleep.    Progress on treatment goals: Pt has stabilized the suicidal crisis and denies any current suicidal ideation.    Additional collateral information: None at this time.  Will contact pt's mother prior to discharge.     Mental Status:     Appearance:   Appropriate    Eye Contact:   Good    Psychomotor Behavior: Normal    Attitude:   Cooperative  Interested Friendly Pleasant   Orientation:   All   Speech    Rate / Production: Normal/ Responsive    Volume:  Normal    Mood:    Normal   Affect:    Blunted    Thought Content:  Clear    Thought Form:  Coherent    Insight:    Fair       Plan: Pt will stay in observation one more night to continue to stabilize and get more rest.  She denies any current suicidal ideation.    Disposition: Other: Observation    Rationale for disposition: Pt will remain in observation overnight for further observation and stabilization.    Reviewed assessment with attending provider: Yes     Total time spent with patient:.25 hrs     CPT code: 95773 - Psychotherapy (with patient) - 30 (16-37*) min      ARVIND Marvin

## 2021-11-03 NOTE — ED NOTES
Acetaminophen and salicylate levels came back and they were<2. Poison control called and were updated alongside stable VS and they said they were signing off on the case. Pt is current A/O x3, no signs of nausea, or elevated BP noted as at the time of this note. Will continue to monitor.
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
## 2021-11-03 NOTE — TREATMENT PLAN
EmPATH Treatment Plan    Client's Name: Dalila Rosario  YOB: 1985    DSM-5 Diagnoses: F33.2; F41.1; F43.10    Psychosocial / Contextual Factors: Patient presented to the emPATH unit with the following concerns: [facility]  Pt has experienced increased stressors over the last year due to chronic medical issues and was feeling helpless and hopeless.    Anticipated number of sessions or this episode of care: 1-4    MeasurableTreatment Goal(s) related to diagnosis / functional impairment(s)    Goal 1: Patient will stabilize the suicidal crisis.    Objective #A     Patient will identify positives in their life.  Status: New as of November 2, 2021    Intervention(s)  LMHP will assist pt in exploring/identifying positives.    Objective #B  Patient will identify and replace negative thinking patterns.  Status: New as of November 2, 2021    Intervention(s)  LMHP assist patient in developing coping strategies.              Appearance:   Appropriate    Eye Contact:   Good    Psychomotor Behavior: Normal    Attitude:   Cooperative  Interested Friendly Pleasant   Orientation:   All   Speech    Rate / Production: Normal/ Responsive    Volume:  Normal    Mood:    Anxious  Depressed    Affect:    Appropriate    Thought Content:  Clear    Thought Form:  Coherent  Logical    Insight:    Good           PLAN:   Patient will board in emPATH until patient and treatment deem it appropriate to either discharge or admit to a higher level of care. Details: Observation overnight and reassess in the AM.      ARVIND Marvin                                                           ________

## 2021-11-03 NOTE — ED PROVIDER NOTES
EmPATH Unit - Psychiatric Consultation  [facility] Emergency Department    Dalila Rosario MRN: [id number]   Age: 36 year old YOB: 1985     History     Chief Complaint   Patient presents with     Drug Overdose     Suicidal     HPI  Dalila Rosario is a 36 year old female with history notable for major depressive disorder was admitted to the empath unit following a suicide attempt via overdose.  On reassessment today, she reports that her mood is beginning to improve while citing that she slept very well last night.  She reviewed with me that insomnia tends to be a trigger to worsening mood and anxiety symptoms, eventually leading to the onset of suicidal thoughts.  She related this course as leading to her most recent suicide attempt.  Now that she is sleeping much better, she feels more hopeful regarding continued improvements.  She denied active suicidal thoughts today.  Energy is low.  Appetite is less than ideal.  Concentration is improving.    Past Medical History  Past Medical History:   Diagnosis Date     Anxiety      Attempted suicide (H)      History of blood transfusion      Major depression      Myelodysplasia of the spinal cord (H)      OCD (obsessive compulsive disorder)      Other chronic pain      Past Surgical History:   Procedure Laterality Date     AMPUTATE LEG ABOVE KNEE Left 2016    Procedure: AMPUTATE LEG ABOVE KNEE;  Surgeon: Shubham Enrique MD;  Location: [facility]     AMPUTATE REVISION STUMP LOWER EXTREMITY Left 2016    Procedure: AMPUTATE REVISION STUMP LOWER EXTREMITY;  Surgeon: Shubham Enrique MD;  [facility]     ARTHROPLASTY REVISION KNEE Left 8/3/2015    Procedure: ARTHROPLASTY REVISION KNEE;  Surgeon: Shubham Enrique MD;  [facility]     EXCHANGE POLY COMPONENT ARTHROPLASTY KNEE Left 2015    Procedure: EXCHANGE POLY COMPONENT ARTHROPLASTY KNEE;  Surgeon: Shubham Enrique MD;  [facility]: [facility]     GYN SURGERY       x3      IRRIGATION AND DEBRIDEMENT KNEE, COMBINED Left 11/30/2015    Procedure: COMBINED IRRIGATION AND DEBRIDEMENT KNEE;  Surgeon: Shubham Enrique MD;  [facility]: [facility]     ORTHOPEDIC SURGERY      22 on legs and bladder due to myelodisplasia of spinal column.      REMOVE HARDWARE ARTHROPLASTY KNEE Left 1/7/2016    Procedure: REMOVE HARDWARE ARTHROPLASTY KNEE;  Surgeon: Shubham Enrique MD;  [facility]     REMOVE HARDWARE LOWER EXTREMITY Left 8/3/2015    Procedure: REMOVE HARDWARE LOWER EXTREMITY;  Surgeon: Shubham Enrique MD;  [facility]     buPROPion (WELLBUTRIN XL) 150 MG 24 hr tablet  ciprofloxacin (CIPRO) 500 MG tablet  gabapentin (NEURONTIN) 400 MG capsule  gabapentin (NEURONTIN) 400 MG capsule  hydrOXYzine (ATARAX) 25 MG tablet  Mirtazapine (REMERON PO)      Allergies   Allergen Reactions     Droperidol Anaphylaxis     Lidocaine      Other reaction(s): Seizures - was told by allergist that she is not allergic and had reaction due to quantity of lido that she received.      Benzoin Tincture [Benzoin] Blisters     Vancomycin Other (See Comments)     Other reaction(s): Other (see comments), Chaz Syndrome  Slowed rate down and given benadryl.  Slowed rate down and given benadryl.  Slowed rate down and given benadryl.  Slowed rate down and given benadryl.       Family History  Family History   Problem Relation Age of Onset     Depression Mother      Substance Abuse Father      Depression Maternal Grandmother      Bipolar Disorder Maternal Grandmother      Depression Maternal Grandfather      Bipolar Disorder Maternal Grandfather      Substance Abuse Maternal Grandfather      Depression Brother      Substance Abuse Brother      Attention Deficit Disorder Brother      Anxiety Disorder Son      Depression Daughter      Anxiety Disorder Daughter      Anxiety Disorder Daughter      Social History   Social History     Tobacco Use     Smoking status: Current Some Day Smoker     Packs/day: 0.50     Types:  "Cigarettes     Smokeless tobacco: Never Used   Substance Use Topics     Alcohol use: Yes     Alcohol/week: 0.0 standard drinks     Comment: not often     Drug use: Yes     Types: IV, Methamphetamines     Comment: meth , marijuana /quit 1 year ago      Past medical history, past surgical history, medications, allergies, family history, and social history were reviewed with the patient. No additional pertinent items.       Review of Systems  A complete review of systems was performed with pertinent positives and negatives noted in the HPI, and all other systems negative.    Physical Examination   BP: (!) 149/102  Pulse: (!) 133  Temp: 97.6  F (36.4  C)  Resp: 20  Height: 134.6 cm (4' 5\")  Weight: 56.7 kg (125 lb)  SpO2: 98 %    Physical Exam  General: Appears stated age.   Neuro: Alert and fully oriented. Extremities appear to demonstrate normal strength on visual inspection.   Integumentary/Skin: no rash visualized, normal color    Psychiatric Examination   Appearance: awake, alert  Attitude:  cooperative  Eye Contact:  fair  Mood:  sad  and better  Affect:  mood congruent  Speech:  clear, coherent  Psychomotor Behavior:  no evidence of tardive dyskinesia, dystonia, or tics  Thought Process:  linear  Associations:  no loose associations  Thought Content:  no evidence of suicidal ideation or homicidal ideation and no evidence of psychotic thought  Insight:  fair  Judgement:  fair  Oriented to:  time, person, and place  Attention Span and Concentration:  fair  Recent and Remote Memory:  fair  Language: able to name/identify objects without impairment  Fund of Knowledge: intact with awareness of current and past events    ED Course        Labs Ordered and Resulted from Time of ED Arrival to Time of ED Departure   CBC WITH PLATELETS AND DIFFERENTIAL - Abnormal       Result Value    WBC Count 5.5      RBC Count 4.23      Hemoglobin 10.3 (*)     Hematocrit 34.3 (*)     MCV 81      MCH 24.3 (*)     MCHC 30.0 (*)     RDW " 17.7 (*)     Platelet Count 438      % Neutrophils 69      % Lymphocytes 21      % Monocytes 8      % Eosinophils 1      % Basophils 1      % Immature Granulocytes 0      NRBCs per 100 WBC 0      Absolute Neutrophils 3.8      Absolute Lymphocytes 1.1      Absolute Monocytes 0.5      Absolute Eosinophils 0.0      Absolute Basophils 0.1      Absolute Immature Granulocytes 0.0      Absolute NRBCs 0.0     ACETAMINOPHEN LEVEL - Abnormal    Acetaminophen <2 (*)    COMPREHENSIVE METABOLIC PANEL - Abnormal    Sodium 139      Potassium 3.4      Chloride 111 (*)     Carbon Dioxide (CO2) 21      Anion Gap 7      Urea Nitrogen 7      Creatinine 0.54      Calcium 8.2 (*)     Glucose 113 (*)     Alkaline Phosphatase 65      AST 15      ALT 23      Protein Total 7.0      Albumin 3.3 (*)     Bilirubin Total 0.9      GFR Estimate >90     HCG QUALITATIVE PREGNANCY - Normal    hCG Serum Qualitative Negative     COVID-19 VIRUS (CORONAVIRUS) BY PCR - Normal    SARS CoV2 PCR Negative     SALICYLATE LEVEL - Normal    Salicylate <2     ETHYL ALCOHOL LEVEL - Normal    Alcohol ethyl <0.01     DRUG ABUSE SCREEN 77 URINE (FL, RH, SH) - Normal    Amphetamines Urine Screen Negative      Barbiturates Urine Screen Negative      Benzodiazepines Urine Screen Negative      Cannabinoids Urine Screen Negative      Cocaine Urine Screen Negative      Opiates Urine Screen Negative      PCP Urine Screen Negative     BLOOD GAS VENOUS    pH Venous 7.35      pCO2 Venous 44      pO2 Venous 33      Bicarbonate Venous 24      Base Excess/Deficit (+/-) -1.8      FIO2 2         Assessments & Plan (with Medical Decision Making)   Patient presenting with suicide attempts via overdose. Nursing notes reviewed noting no acute issues.     I have reviewed the assessment completed by the [facility].     Preliminary diagnosis:    ICD-10-CM    1. Severe episode of recurrent major depressive disorder, without psychotic features (H)  F33.2    2. Polysubstance overdose, intentional  self-harm, initial encounter (H)  T50.902A    3. Suicide attempt (H)  T14.91XA    4. Insomnia, unspecified type  G47.00    5. Generalized anxiety disorder  F41.1        Treatment Plan:  -Reduce doxepin from 25 mg to 10 mg at bedtime to minimize polypharmacy as well as reducing access to medications that could potentially be lethal in overdose.  -Increase Seroquel to 50 mg at bedtime to compensate for gradual dose reduction of doxepin while aiming to minimize insomnia.  -Continue Wellbutrin and mirtazapine for treatment of her mood and anxiety disorders.  -Continue observation status for 1 more night and plan to reassess tomorrow in anticipation of readiness to discharge home.    --  Brian Navarrete MD   [facility] EMERGENCY DEPT  EmPATH Unit  11/2/2021      Brian Navarrete MD  11/03/21 [id number]

## 2021-11-03 NOTE — ED PROVIDER NOTES
"[facility] Unit - Initial Psychiatric Observation Note  [facility] Emergency Department  Observation Initiation Date: Nov 2, 2021    Dalila Rosario MRN: [id number]   Age: 36 year old YOB: 1985     History     Chief Complaint   Patient presents with     Drug Overdose     Suicidal     HPI  Dalila Rosario is a 36 year old female with a past history notable for spina bifida, substance abuse in remission, depression and suicide attempt who presents after ingesting approximately 20 tablets of Hydroxyzine 25 mg and 20 tablets of Robaxin 750 mg in an attempt to end her life.  Once medically cleared, she was transferred to [facility] for psychiatric consultation.  On interview, patient describes many life stressors many related to her physical health.  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  She has had several other medical procedures over the years causing her chronic pain and negatively impacting her already impaired mobility.  She is no longer able to ambulate with crutches and has found herself dependent on a wheelchair.  She describes a chronic history with depression but finds her mood is overall stable but her sleep is impaired which she feels is what contributed to her overdose today.  She felt if she was more rested, she would have not been so reactive when she was dealing with financial issues today that contributed to her feelings hopelessness. She shared that she started to write down her emotions after a stressful exchange with her  over upcoming taxes and found the more she wrote, the more she felt like \"ending it all\" as a solution to her problems.  She described how she selected two of her sedating medications, and without counting the pills, took a handful of each, with her estimating it to be about 20 each.  She sent a text message to her sister stating what she did who then called police.    Past Medical History  Past Medical History:   Diagnosis " Date     Anxiety      Attempted suicide (H)      History of blood transfusion      Major depression      Myelodysplasia of the spinal cord (H)      OCD (obsessive compulsive disorder)      Other chronic pain      Past Surgical History:   Procedure Laterality Date     AMPUTATE LEG ABOVE KNEE Left 2016    Procedure: AMPUTATE LEG ABOVE KNEE;  Surgeon: Shubham Enrique MD;  Location: [facility] OR     AMPUTATE REVISION STUMP LOWER EXTREMITY Left 2016    Procedure: AMPUTATE REVISION STUMP LOWER EXTREMITY;  Surgeon: Shubham Enrique MD;  [facility]: [facility] OR     ARTHROPLASTY REVISION KNEE Left 8/3/2015    Procedure: ARTHROPLASTY REVISION KNEE;  Surgeon: Shubham Enrique MD;  [facility] OR     EXCHANGE POLY COMPONENT ARTHROPLASTY KNEE Left 2015    Procedure: EXCHANGE POLY COMPONENT ARTHROPLASTY KNEE;  Surgeon: Shubham Enrique MD;  Location: [facility] OR     GYN SURGERY       x3     IRRIGATION AND DEBRIDEMENT KNEE, COMBINED Left 2015    Procedure: COMBINED IRRIGATION AND DEBRIDEMENT KNEE;  Surgeon: Shubham Enrique MD;  Location: [facility] OR     ORTHOPEDIC SURGERY      22 on legs and bladder due to myelodisplasia of spinal column.      REMOVE HARDWARE ARTHROPLASTY KNEE Left 2016    Procedure: REMOVE HARDWARE ARTHROPLASTY KNEE;  Surgeon: Shubham Enrique MD;  [facility] OR     REMOVE HARDWARE LOWER EXTREMITY Left 8/3/2015    Procedure: REMOVE HARDWARE LOWER EXTREMITY;  Surgeon: Shubham Enrique MD;  [facility]: [facility] OR     gabapentin (NEURONTIN) 400 MG capsule  gabapentin (NEURONTIN) 400 MG capsule  buPROPion (WELLBUTRIN XL) 150 MG 24 hr tablet  ciprofloxacin (CIPRO) 500 MG tablet  hydrOXYzine (ATARAX) 25 MG tablet  Mirtazapine (REMERON PO)      Allergies   Allergen Reactions     Droperidol Anaphylaxis     Lidocaine      Other reaction(s): Seizures - was told by allergist that she is not allergic and had reaction due to quantity of lido that she received.      Benzoin Tincture [Benzoin] Blisters      "Vancomycin Other (See Comments)     Other reaction(s): Other (see comments), Chaz Syndrome  Slowed rate down and given benadryl.  Slowed rate down and given benadryl.  Slowed rate down and given benadryl.  Slowed rate down and given benadryl.       Family History  Family History   Problem Relation Age of Onset     Depression Mother      Substance Abuse Father      Depression Maternal Grandmother      Bipolar Disorder Maternal Grandmother      Depression Maternal Grandfather      Bipolar Disorder Maternal Grandfather      Substance Abuse Maternal Grandfather      Depression Brother      Substance Abuse Brother      Attention Deficit Disorder Brother      Anxiety Disorder Son      Depression Daughter      Anxiety Disorder Daughter      Anxiety Disorder Daughter      Social History   Social History     Tobacco Use     Smoking status: Current Some Day Smoker     Packs/day: 0.50     Types: Cigarettes     Smokeless tobacco: Never Used   Substance Use Topics     Alcohol use: Yes     Alcohol/week: 0.0 standard drinks     Comment: not often     Drug use: Yes     Types: IV, Methamphetamines     Comment: meth , marijuana /quit 1 year ago      Past medical history, past surgical history, medications, allergies, family history, and social history were reviewed with the patient. No additional pertinent items.       Review of Systems  A complete review of systems was performed with pertinent positives and negatives noted in the HPI, and all other systems negative.    Physical Examination   BP: (!) 149/102  Pulse: (!) 133  Temp: 97.6  F (36.4  C)  Resp: 20  Height: 134.6 cm (4' 5\")  Weight: 56.7 kg (125 lb)  SpO2: 98 %    Physical Exam  General: Appears stated age.   Neuro: Alert and fully oriented. Extremities appear to demonstrate normal strength on visual inspection.   Integumentary/Skin: no rash visualized, normal color    Psychiatric Examination   Appearance: fatigued  Attitude:  cooperative  Eye Contact:  good  Mood:  sad "   Affect:  mood congruent  Speech:  clear, coherent  Psychomotor Behavior:  no evidence of tardive dyskinesia, dystonia, or tics  Thought Process:  logical and linear  Associations:  no loose associations  Thought Content:  no evidence of suicidal ideation or homicidal ideation and no evidence of psychotic thought  Insight:  good  Judgement:  intact  Oriented to:  time, person, and place  Attention Span and Concentration:  intact  Recent and Remote Memory:  intact  Language: able to name/identify objects without impairment  Fund of Knowledge: intact with awareness of current and past events    ED Course        Labs Ordered and Resulted from Time of ED Arrival to Time of ED Departure   CBC WITH PLATELETS AND DIFFERENTIAL - Abnormal       Result Value    WBC Count 5.5      RBC Count 4.23      Hemoglobin 10.3 (*)     Hematocrit 34.3 (*)     MCV 81      MCH 24.3 (*)     MCHC 30.0 (*)     RDW 17.7 (*)     Platelet Count 438      % Neutrophils 69      % Lymphocytes 21      % Monocytes 8      % Eosinophils 1      % Basophils 1      % Immature Granulocytes 0      NRBCs per 100 WBC 0      Absolute Neutrophils 3.8      Absolute Lymphocytes 1.1      Absolute Monocytes 0.5      Absolute Eosinophils 0.0      Absolute Basophils 0.1      Absolute Immature Granulocytes 0.0      Absolute NRBCs 0.0     ACETAMINOPHEN LEVEL - Abnormal    Acetaminophen <2 (*)    COMPREHENSIVE METABOLIC PANEL - Abnormal    Sodium 139      Potassium 3.4      Chloride 111 (*)     Carbon Dioxide (CO2) 21      Anion Gap 7      Urea Nitrogen 7      Creatinine 0.54      Calcium 8.2 (*)     Glucose 113 (*)     Alkaline Phosphatase 65      AST 15      ALT 23      Protein Total 7.0      Albumin 3.3 (*)     Bilirubin Total 0.9      GFR Estimate >90     HCG QUALITATIVE PREGNANCY - Normal    hCG Serum Qualitative Negative     COVID-19 VIRUS (CORONAVIRUS) BY PCR - Normal    SARS CoV2 PCR Negative     SALICYLATE LEVEL - Normal    Salicylate <2     ETHYL ALCOHOL LEVEL  - Normal    Alcohol ethyl <0.01     DRUG ABUSE SCREEN 77 URINE (FL, RH, SH) - Normal    Amphetamines Urine Screen Negative      Barbiturates Urine Screen Negative      Benzodiazepines Urine Screen Negative      Cannabinoids Urine Screen Negative      Cocaine Urine Screen Negative      Opiates Urine Screen Negative      PCP Urine Screen Negative     BLOOD GAS VENOUS    pH Venous 7.35      pCO2 Venous 44      pO2 Venous 33      Bicarbonate Venous 24      Base Excess/Deficit (+/-) -1.8      FIO2 2         Assessments & Plan (with Medical Decision Making)   Patient presenting with suicidal ideation and insomnia . Nursing notes reviewed noting no acute issues.     I have reviewed the assessment completed by the [facility].     I have reviewed patients most recent note from her outpatient psychiatrist, Dr. Indu Crow.    During the observation period, the patient did not require medications for agitation, and did not require restraints/seclusion for patient and/or provider safety.     The patient was found to have a psychiatric condition that would benefit from an observation stay in the emergency department for further psychiatric stabilization and/or coordination of a safe disposition. The observation plan includes serial assessments of psychiatric condition, potential administration of medications if indicated, further disposition pending the patient's psychiatric course during the monitoring period.     Preliminary diagnosis:    ICD-10-CM    1. Polysubstance overdose, intentional self-harm, initial encounter (H)  T50.902A    2. Suicide attempt (H)  T14.91XA    3. Depression, unspecified depression type  F32.A    4. Insomnia, unspecified type  G47.00         Treatment Plan:  -Observation status for further safety monitoring and psychotropic intervention.  -Restart home medications: Remeron 45 mg at bedtime, doxepin 25 mg at bedtime, Gabapentin 400 mg twice a day and 800 mg at bedtime, Wellbutrin 450 mg in the morning.   Holding off on atarax and methocarbamol due to recent overdose.  -Patient reports being on Seroquel in the past for sleep with good results. She was unsure of the dose. Will start her at 25 mg tonight with the option to take another 25 mg for a total of 50 mg.  -Reassess tomorrow.      --  NATHAN Mahan CNP   [facility] EMERGENCY DEPT  [facility] Unit  11/2/2021        Laura Petit APRN CNP  11/02/21 0433

## 2021-11-04 VITALS
HEIGHT: 55 IN | RESPIRATION RATE: 16 BRPM | DIASTOLIC BLOOD PRESSURE: 71 MMHG | OXYGEN SATURATION: 99 % | TEMPERATURE: 99.9 F | HEART RATE: 109 BPM | SYSTOLIC BLOOD PRESSURE: 134 MMHG | BODY MASS INDEX: 28.93 KG/M2 | WEIGHT: 125 LBS

## 2021-11-04 LAB
ATRIAL RATE - MUSE: 111 BPM
DIASTOLIC BLOOD PRESSURE - MUSE: NORMAL MMHG
INTERPRETATION ECG - MUSE: NORMAL
P AXIS - MUSE: 60 DEGREES
PR INTERVAL - MUSE: 152 MS
QRS DURATION - MUSE: 78 MS
QT - MUSE: 334 MS
QTC - MUSE: 454 MS
R AXIS - MUSE: 39 DEGREES
SYSTOLIC BLOOD PRESSURE - MUSE: NORMAL MMHG
T AXIS - MUSE: 27 DEGREES
VENTRICULAR RATE- MUSE: 111 BPM

## 2021-11-04 PROCEDURE — 250N000013 HC RX MED GY IP 250 OP 250 PS 637: Performed by: PSYCHIATRY & NEUROLOGY

## 2021-11-04 PROCEDURE — G0378 HOSPITAL OBSERVATION PER HR: HCPCS

## 2021-11-04 PROCEDURE — 99217 PR OBSERVATION CARE DISCHARGE: CPT | Performed by: PSYCHIATRY & NEUROLOGY

## 2021-11-04 PROCEDURE — 250N000013 HC RX MED GY IP 250 OP 250 PS 637: Performed by: NURSE PRACTITIONER

## 2021-11-04 RX ORDER — QUETIAPINE FUMARATE 50 MG/1
50-100 TABLET, FILM COATED ORAL AT BEDTIME
Qty: 45 TABLET | Refills: 0 | Status: SHIPPED | OUTPATIENT
Start: 2021-11-04

## 2021-11-04 RX ORDER — BUPROPION HYDROCHLORIDE 450 MG/1
450 TABLET, FILM COATED, EXTENDED RELEASE ORAL EVERY MORNING
Qty: 30 TABLET | Refills: 0 | Status: SHIPPED | OUTPATIENT
Start: 2021-11-04

## 2021-11-04 RX ORDER — ACETAMINOPHEN 500 MG
500 TABLET ORAL ONCE
Status: DISCONTINUED | OUTPATIENT
Start: 2021-11-04 | End: 2021-11-04 | Stop reason: HOSPADM

## 2021-11-04 RX ORDER — IBUPROFEN 400 MG/1
800 TABLET, FILM COATED ORAL ONCE
Status: COMPLETED | OUTPATIENT
Start: 2021-11-04 | End: 2021-11-04

## 2021-11-04 RX ADMIN — IBUPROFEN 800 MG: 400 TABLET ORAL at 11:28

## 2021-11-04 RX ADMIN — GABAPENTIN 400 MG: 400 CAPSULE ORAL at 08:46

## 2021-11-04 RX ADMIN — ACETAMINOPHEN 500 MG: 500 TABLET, FILM COATED ORAL at 08:45

## 2021-11-04 RX ADMIN — BUPROPION HYDROCHLORIDE 450 MG: 300 TABLET, EXTENDED RELEASE ORAL at 08:46

## 2021-11-04 NOTE — DISCHARGE INSTRUCTIONS
Attend appts as scheduled:    Keon Peralta, PhD, LP  Psychology  11/12/21; 12/01    Indu Crow, DO  Psychiatry  12/14/21    If I am feeling unsafe or I am in a crisis, I will:   Contact my established care providers   Call the National Suicide Prevention Lifeline: 571.392.7231   Go to the nearest emergency room   Call [phone]          Warning signs that I or other people might notice when a crisis is developing for me: Getting weepy, tired    Things I am able to do on my own to cope or help me feel better: Journaling     Things that I am able to do with others to cope or help me better: Talk to mom, sister and      Things I can use or do for distraction: Journaling     Changes I can make to support my mental health and wellness: Take meds as prescribed, go to therapy     People in my life that I can ask for help: Mom, sister,      Your [facility] has a mental health crisis team you can call 24/7: [facility] Crisis Line Number: [phone]    Other things that are important when I m in crisis: I can reach out to my mom, she knows my cues.     Additional resources and information:

## 2021-11-04 NOTE — ED NOTES
Pt appeared to be sleeping most of the night. Respirations are even and unlabored. Pt got up once to use RR. No acute changes noted this shift.

## 2021-11-04 NOTE — ED NOTES
[facility] Reassessment and Progress Note    Client Name: Dalila Rosario  Date: November 4, 2021       Presenting issue that brought patient to the [facility] unit: Pt presented to the ED due to suicide attempt by intentional overdose.  She reports increased depression, anxiety and difficulty sleeping.  She has several stressors.    Current presentation on the unit: Pt is cooperative, she is experiencing increased pain due to physical issues and is visibly uncomfortable.      Current risk to self or others? No    Summary of therapeutic interventions completed with patient: Assisted pt in developing coping strategies and developed safety plan.    Treatment objectives addressed in this session: Pt identified coping strategies and replaced negative thinking patterns.    Progress on treatment goals: Pt stabilized the suicidal crisis.    Additional collateral information: Spoke with pt's mom, Colleen, reviewed her safety plan and progress while here at [facility].  She was in agreement with discharge and how she can provide support to patient when she returns home.     Mental Status:     Appearance:   Appropriate    Eye Contact:   Good    Psychomotor Behavior: Restless    Attitude:   Cooperative  Interested Friendly Pleasant   Orientation:   All   Speech    Rate / Production: Normal/ Responsive    Volume:  Normal    Mood:    Normal   Affect:    Appropriate    Thought Content:  Clear    Thought Form:  Coherent  Goal Directed  Logical    Insight:    Good       Plan: Pt will discharge home with therapy and psychiatry appointments scheduled with her existing providers.      Disposition: Individual therapy  and Medication management    Rationale for disposition: Pt denied any safety concerns and completed a safety plan.  She was evaluated by psychiatry who is in agreement that patient is safe and appropriate for discharge.    Reviewed assessment with attending provider: Yes     Total time spent with patient:.25 hrs     CPT code:  15287 - Psychotherapy (with patient) - 30 (16-37*) min     If I am feeling unsafe or I am in a crisis, I will:   Contact my established care providers   Call the National Suicide Prevention Lifeline: [phone]   Go to the nearest emergency room   Call           Warning signs that I or other people might notice when a crisis is developing for me: Getting weepy, tired    Things I am able to do on my own to cope or help me feel better: Journaling     Things that I am able to do with others to cope or help me better: Talk to mom, sister and      Things I can use or do for distraction: Journaling     Changes I can make to support my mental health and wellness: Take meds as prescribed, go to therapy     People in my life that I can ask for help: Mom, sister,      Your [facility] has a mental health crisis team you can call 24/7: [facility] Crisis Line Number: [phone]    Other things that are important when I m in crisis: I can reach out to my mom, she knows my cues.     Additional resources and information:          ARVIND Marvin

## 2021-11-04 NOTE — PROGRESS NOTES
Discharge instructions reviewed with patient including follow-up care plan. Reviewed safety plan and outpatient resources. Denies SI and HI. All belongings that were brought into the hospital have been returned to patient. Escorted off the unit at 1210 accompanied by [facility] staff. Discharged to home via private transportation.

## 2021-11-04 NOTE — ED PROVIDER NOTES
EmPATH Unit - Psychiatry  Combined Observation Note and Discharge Summary  [facility] Emergency Department  Observation Initiation Date: Nov 2, 2021    Dalila Rosario MRN: [id number]   Age: 36 year old YOB: 1985     History     Chief Complaint   Patient presents with     Drug Overdose     Suicidal     HPI  Dalila Rosario is a 36 year old female with history notable for major depressive disorder was admitted to the empath unit following a suicide attempt via overdose.  On reassessment today, she continues to report that her mood is improved however admits she is more uncomfortable today due to a muscle spasm that she is experiencing.  She is familiar with these episodes as they occur approximately once or twice a month and last about 1 day.  She typically uses a combination of Tylenol and ibuprofen, occasionally Vistaril, and often a hot pack to help alleviate the symptoms.  She received 500 mg of Tylenol earlier today however usually uses a 1000 mg during these episodes.  She is looking forward to returning home so that she may lay in her own bed and use various pillows and a familiar heat pack.  Sleep was slightly disrupted last night due to this pain however she noted fair sedation from the Seroquel and is expects that she would have slept well if it was not for the episode of pain.  She remains hopeful and engaged in her treatment plan.  She continues to deny suicidal thoughts.      Past Medical History  Past Medical History:   Diagnosis Date     Anxiety      Attempted suicide (H)      History of blood transfusion      Major depression      Myelodysplasia of the spinal cord (H)      OCD (obsessive compulsive disorder)      Other chronic pain      Past Surgical History:   Procedure Laterality Date     AMPUTATE LEG ABOVE KNEE Left 1/7/2016    Procedure: AMPUTATE LEG ABOVE KNEE;  Surgeon: Shubham Enrique MD;  Location: [facility] OR     AMPUTATE REVISION STUMP LOWER EXTREMITY Left 1/9/2016     Procedure: AMPUTATE REVISION STUMP LOWER EXTREMITY;  Surgeon: Shubham Enrique MD;  [facility]: [facility]     ARTHROPLASTY REVISION KNEE Left 8/3/2015    Procedure: ARTHROPLASTY REVISION KNEE;  Surgeon: Shubham Enrique MD;  [facility]     EXCHANGE POLY COMPONENT ARTHROPLASTY KNEE Left 2015    Procedure: EXCHANGE POLY COMPONENT ARTHROPLASTY KNEE;  Surgeon: Shubham Enrique MD;  [facility]     GYN SURGERY       x3     IRRIGATION AND DEBRIDEMENT KNEE, COMBINED Left 2015    Procedure: COMBINED IRRIGATION AND DEBRIDEMENT KNEE;  Surgeon: Shubham Enrique MD;  Location: [facility]     ORTHOPEDIC SURGERY      22 on legs and bladder due to myelodisplasia of spinal column.      REMOVE HARDWARE ARTHROPLASTY KNEE Left 2016    Procedure: REMOVE HARDWARE ARTHROPLASTY KNEE;  Surgeon: Shubham Enrique MD;  [facility]     REMOVE HARDWARE LOWER EXTREMITY Left 8/3/2015    Procedure: REMOVE HARDWARE LOWER EXTREMITY;  Surgeon: Shubham Enrique MD;  [facility] OR     buPROPion 450 MG TB24  ciprofloxacin (CIPRO) 500 MG tablet  gabapentin (NEURONTIN) 400 MG capsule  gabapentin (NEURONTIN) 400 MG capsule  hydrOXYzine (ATARAX) 25 MG tablet  Mirtazapine (REMERON PO)  QUEtiapine (SEROQUEL) 50 MG tablet      Allergies   Allergen Reactions     Droperidol Anaphylaxis     Lidocaine      Other reaction(s): Seizures - was told by allergist that she is not allergic and had reaction due to quantity of lido that she received.      Benzoin Tincture [Benzoin] Blisters     Vancomycin Other (See Comments)     Other reaction(s): Other (see comments), Chaz Syndrome  Slowed rate down and given benadryl.  Slowed rate down and given benadryl.  Slowed rate down and given benadryl.  Slowed rate down and given benadryl.       Family History  Family History   Problem Relation Age of Onset     Depression Mother      Substance Abuse Father      Depression Maternal Grandmother      Bipolar Disorder Maternal Grandmother   "    Depression Maternal Grandfather      Bipolar Disorder Maternal Grandfather      Substance Abuse Maternal Grandfather      Depression Brother      Substance Abuse Brother      Attention Deficit Disorder Brother      Anxiety Disorder Son      Depression Daughter      Anxiety Disorder Daughter      Anxiety Disorder Daughter      Social History   Social History     Tobacco Use     Smoking status: Current Some Day Smoker     Packs/day: 0.50     Types: Cigarettes     Smokeless tobacco: Never Used   Substance Use Topics     Alcohol use: Yes     Alcohol/week: 0.0 standard drinks     Comment: not often     Drug use: Yes     Types: IV, Methamphetamines     Comment: meth , marijuana /quit 1 year ago      Past medical history, past surgical history, medications, allergies, family history, and social history were reviewed with the patient. No additional pertinent items.       Review of Systems  A complete review of systems was performed with pertinent positives and negatives noted in the HPI, and all other systems negative.    Physical Examination   BP: (!) 149/102  Pulse: (!) 133  Temp: 97.6  F (36.4  C)  Resp: 20  Height: 134.6 cm (4' 5\")  Weight: 56.7 kg (125 lb)  SpO2: 98 %    Physical Exam  General: Appears stated age.   Neuro: Alert and fully oriented. Extremities appear to demonstrate normal strength on visual inspection.   Integumentary/Skin: no rash visualized, normal color    Psychiatric Examination   Appearance: awake, alert  Attitude:  cooperative  Eye Contact:  fair  Mood:  better  Affect:  mood congruent  Speech:  clear, coherent  Psychomotor Behavior:  no evidence of tardive dyskinesia, dystonia, or tics  Thought Process:  logical and linear  Associations:  no loose associations  Thought Content:  no evidence of suicidal ideation or homicidal ideation and no evidence of psychotic thought  Insight:  fair  Judgement:  intact  Oriented to:  time, person, and place  Attention Span and Concentration:  fair  Recent " and Remote Memory:  intact  Language: able to name/identify objects without impairment  Fund of Knowledge: intact with awareness of current and past events    ED Course        Labs Ordered and Resulted from Time of ED Arrival to Time of ED Departure   CBC WITH PLATELETS AND DIFFERENTIAL - Abnormal       Result Value    WBC Count 5.5      RBC Count 4.23      Hemoglobin 10.3 (*)     Hematocrit 34.3 (*)     MCV 81      MCH 24.3 (*)     MCHC 30.0 (*)     RDW 17.7 (*)     Platelet Count 438      % Neutrophils 69      % Lymphocytes 21      % Monocytes 8      % Eosinophils 1      % Basophils 1      % Immature Granulocytes 0      NRBCs per 100 WBC 0      Absolute Neutrophils 3.8      Absolute Lymphocytes 1.1      Absolute Monocytes 0.5      Absolute Eosinophils 0.0      Absolute Basophils 0.1      Absolute Immature Granulocytes 0.0      Absolute NRBCs 0.0     ACETAMINOPHEN LEVEL - Abnormal    Acetaminophen <2 (*)    COMPREHENSIVE METABOLIC PANEL - Abnormal    Sodium 139      Potassium 3.4      Chloride 111 (*)     Carbon Dioxide (CO2) 21      Anion Gap 7      Urea Nitrogen 7      Creatinine 0.54      Calcium 8.2 (*)     Glucose 113 (*)     Alkaline Phosphatase 65      AST 15      ALT 23      Protein Total 7.0      Albumin 3.3 (*)     Bilirubin Total 0.9      GFR Estimate >90     HCG QUALITATIVE PREGNANCY - Normal    hCG Serum Qualitative Negative     COVID-19 VIRUS (CORONAVIRUS) BY PCR - Normal    SARS CoV2 PCR Negative     SALICYLATE LEVEL - Normal    Salicylate <2     ETHYL ALCOHOL LEVEL - Normal    Alcohol ethyl <0.01     DRUG ABUSE SCREEN 77 URINE (FL, RH, SH) - Normal    Amphetamines Urine Screen Negative      Barbiturates Urine Screen Negative      Benzodiazepines Urine Screen Negative      Cannabinoids Urine Screen Negative      Cocaine Urine Screen Negative      Opiates Urine Screen Negative      PCP Urine Screen Negative     BLOOD GAS VENOUS    pH Venous 7.35      pCO2 Venous 44      pO2 Venous 33      Bicarbonate  Venous 24      Base Excess/Deficit (+/-) -1.8      FIO2 2         Assessments & Plan (with Medical Decision Making)   Patient presenting with a suicide attempt via medication overdose. Nursing notes reviewed noting no acute issues.     I have reviewed the assessment completed by the [facility].     During the observation period, the patient did not require medications for agitation, and did not require restraints/seclusion for patient and/or provider safety.    The patient was found to have a psychiatric condition that would benefit from an observation stay in the emergency department for further psychiatric stabilization and/or coordination of a safe disposition. The observation plan includes serial assessments of psychiatric condition, potential administration of medications if indicated, further disposition pending the patient's psychiatric course during the monitoring period.     Preliminary diagnosis:    ICD-10-CM    1. Severe episode of recurrent major depressive disorder, without psychotic features (H)  F33.2    2. Polysubstance overdose, intentional self-harm, initial encounter (H)  T50.902A    3. Suicide attempt (H)  T14.91XA resolved   4. Insomnia, unspecified type  G47.00    5. Generalized anxiety disorder  F41.1        Treatment Plan:  -Discontinue doxepin as planned to minimize polypharmacy and reduce medications that could potentially be lethal in overdose.  -Continue Seroquel 50 mg at bedtime however allow a range of  mg to better promote sleep when needed.  -To address the episodic muscle spasms she is experiencing today, we will utilize a medication regime that has been helpful in the past which includes a combination of Tylenol 1000 mg, ibuprofen 800 mg, and a heat pack.  -Continue Wellbutrin and mirtazapine for treatment of her mood and anxiety disorders.  -Resume outpatient medication management and psychotherapy  -Discharge home today.    After the period in observation care, the patient's  circumstances and mental state were safe for outpatient management. After counseling on the diagnosis, work-up, and treatment plan, the patient was discharged. Close follow-up with a psychiatrist and/or therapist was recommended and community psychiatric resources were provided. Patient is to return to the ED if any urgent or potentially life-threatening concerns.      At the time of discharge, the patient's acute suicide risk was determined to be low due to the following factors: Reduction in the intensity of mood/anxiety symptoms that preceded the admission, denial of suicidal thoughts, denies feeling helpless or helpless, not currently under the influence of alcohol or illicit substances, denies experiencing command hallucinations, no immediate access to firearms. The patient's acute risk could be higher if noncompliant with their treatment plan, medications, follow-up appointments or using illicit substances or alcohol. Protective factors include: social supports, stable housing    --  Brian Navarrete MD   [facility] EMERGENCY DEPT  EmPATH Unit  11/2/2021         Brian Navarrete MD  11/04/21 1055

## 2021-11-04 NOTE — ED NOTES
Pt has been anxious but occasionally appears anxious, with incongruent affect, insight is fair with poor judgment. Pt is willing to spend another night on Observation tonight. Pt requested her muscle relaxant Methocarbamol she overdosed on three days ago. Medication ordered awaiting delivery by pharmacy.

## 2021-11-28 ENCOUNTER — HEALTH MAINTENANCE LETTER (OUTPATIENT)
Age: 36
End: 2021-11-28

## 2022-03-09 ENCOUNTER — OFFICE VISIT (OUTPATIENT)
Dept: URGENT CARE | Facility: URGENT CARE | Age: 37
End: 2022-03-09
Payer: COMMERCIAL

## 2022-03-09 VITALS
DIASTOLIC BLOOD PRESSURE: 70 MMHG | WEIGHT: 125 LBS | RESPIRATION RATE: 16 BRPM | SYSTOLIC BLOOD PRESSURE: 120 MMHG | HEART RATE: 92 BPM | OXYGEN SATURATION: 99 % | BODY MASS INDEX: 31.29 KG/M2 | TEMPERATURE: 98.4 F

## 2022-03-09 DIAGNOSIS — N30.01 ACUTE CYSTITIS WITH HEMATURIA: Primary | ICD-10-CM

## 2022-03-09 DIAGNOSIS — R30.0 DYSURIA: ICD-10-CM

## 2022-03-09 LAB
ALBUMIN UR-MCNC: ABNORMAL MG/DL
APPEARANCE UR: CLEAR
BACTERIA #/AREA URNS HPF: ABNORMAL /HPF
BILIRUB UR QL STRIP: NEGATIVE
COLOR UR AUTO: YELLOW
GLUCOSE UR STRIP-MCNC: NEGATIVE MG/DL
HGB UR QL STRIP: ABNORMAL
KETONES UR STRIP-MCNC: NEGATIVE MG/DL
LEUKOCYTE ESTERASE UR QL STRIP: ABNORMAL
NITRATE UR QL: NEGATIVE
PH UR STRIP: 6 [PH] (ref 5–7)
RBC #/AREA URNS AUTO: ABNORMAL /HPF
SP GR UR STRIP: 1.02 (ref 1–1.03)
SQUAMOUS #/AREA URNS AUTO: ABNORMAL /LPF
UROBILINOGEN UR STRIP-ACNC: 0.2 E.U./DL
WBC #/AREA URNS AUTO: ABNORMAL /HPF

## 2022-03-09 PROCEDURE — 87086 URINE CULTURE/COLONY COUNT: CPT | Performed by: PHYSICIAN ASSISTANT

## 2022-03-09 PROCEDURE — 99214 OFFICE O/P EST MOD 30 MIN: CPT | Performed by: PHYSICIAN ASSISTANT

## 2022-03-09 PROCEDURE — 81001 URINALYSIS AUTO W/SCOPE: CPT | Performed by: PHYSICIAN ASSISTANT

## 2022-03-09 RX ORDER — CIPROFLOXACIN 500 MG/1
500 TABLET, FILM COATED ORAL 2 TIMES DAILY
Qty: 20 TABLET | Refills: 0 | Status: SHIPPED | OUTPATIENT
Start: 2022-03-09 | End: 2022-03-19

## 2022-03-09 RX ORDER — ACETAMINOPHEN 500 MG
2 TABLET ORAL
COMMUNITY
Start: 2020-08-20

## 2022-03-09 RX ORDER — FLUCONAZOLE 150 MG/1
150 TABLET ORAL
Qty: 1 TABLET | Refills: 1 | Status: SHIPPED | OUTPATIENT
Start: 2022-03-09

## 2022-03-10 NOTE — PATIENT INSTRUCTIONS
Patient Education     Understanding Urinary Tract Infections (UTIs)   Most UTIs are caused by bacteria, but they may also be caused by viruses or fungi. Bacteria from the bowel are the most common source of infection. The infection may start because of any of the following:     Sexual activity.  During sex, bacteria can travel from the penis, vagina, or rectum into the urethra.     Bacteria outside the rectum getting into the urethra.  Bacteria on the skin outside the rectum may travel into the urethra. This is more common in women since the rectum and urethra are closer to each other than in men. Wiping from front to back after using the toilet and keeping the area clean can help prevent germs from getting to the urethra.    Blocked urine flow through the urinary tract. If urine sits too long, germs may start to grow out of control.  Parts of the urinary tract  The infection can occur in any part of the urinary tract.       The kidneys. These organs collect and store urine.    The ureters. These tubes carry urine from the kidneys to the bladder.    The bladder. This holds urine until you are ready to let it out.    The urethra. This tube carries urine from the bladder out of the body. It is shorter in women, so bacteria can move through it more easily. The urethra is longer in men, so a UTI is less likely to reach the bladder or kidneys in men.  [employer] last reviewed this educational content on 1/1/2020 2000-2021 [employer]. All rights reserved. This information is not intended as a substitute for professional medical care. Always follow your healthcare professional's instructions.

## 2022-03-11 LAB — BACTERIA UR CULT: NO GROWTH

## 2022-03-15 NOTE — PROGRESS NOTES
SUBJECTIVE:   Dalila Rosario is a 37 year old female who  presents today for a possible UTI. Symptoms of dysuria and frequency have been going on for 2day(s).  Hematuria no.  still presentand moderate.  There is no history of fever, chills, nausea or vomiting.  No history of vaginal or penile discharge. This patient does have a history of urinary tract infections. Patient denies long duration, rigors, flank pain, temperature > 101 degrees F., Vomiting, significant nausea or diarrhea, taking Coumadin and GFR less than 30 within the last year or vaginal discharge, vaginal odor, vaginal itching and dyspareunia (pain in labia/pelvis)     Past Medical History:   Diagnosis Date     Anxiety      Attempted suicide (H)      History of blood transfusion      Major depression      Myelodysplasia of the spinal cord (H)      OCD (obsessive compulsive disorder)      Other chronic pain      Current Outpatient Medications   Medication Sig Dispense Refill     acetaminophen (TYLENOL) 500 MG tablet Take 2 tablets by mouth       buPROPion 450 MG TB24 Take 450 mg by mouth every morning 30 tablet 0     ciprofloxacin (CIPRO) 500 MG tablet Take 1 tablet (500 mg) by mouth 2 times daily for 10 days 20 tablet 0     fluconazole (DIFLUCAN) 150 MG tablet Take 1 tablet (150 mg) by mouth every 7 days 1 tablet 1     gabapentin (NEURONTIN) 400 MG capsule Take 400 mg by mouth 2 times daily       gabapentin (NEURONTIN) 400 MG capsule Take 800 mg by mouth At Bedtime       hydrOXYzine (ATARAX) 25 MG tablet Take 1-2 tablets (25-50 mg) by mouth every 4 hours as needed for other (adjuvant pain) 120 tablet 1     Mirtazapine (REMERON PO) Take 45 mg by mouth At Bedtime        QUEtiapine (SEROQUEL) 50 MG tablet Take 1-2 tablets ( mg) by mouth At Bedtime 45 tablet 0     Social History     Tobacco Use     Smoking status: Current Some Day Smoker     Packs/day: 0.50     Types: Cigarettes     Smokeless tobacco: Never Used   Substance Use Topics      Alcohol use: Yes     Alcohol/week: 0.0 standard drinks     Comment: not often       ROS:   Review of systems negative except as stated above.    OBJECTIVE:  /70   Pulse 92   Temp 98.4  F (36.9  C) (Tympanic)   Resp 16   Wt 56.7 kg (125 lb)   SpO2 99%   BMI 31.29 kg/m    GENERAL APPEARANCE: healthy, alert and no distress  RESP: lungs clear to auscultation - no rales, rhonchi or wheezes  CV: regular rates and rhythm, normal S1 S2, no murmur noted  BACK: No CVA tenderness  SKIN: no suspicious lesions or rashes    Results for orders placed or performed in visit on 03/09/22   UA macro with reflex to Microscopic and Culture - Clinc Collect     Status: Abnormal    Specimen: Urine, Midstream   Result Value Ref Range    Color Urine Yellow Colorless, Straw, Light Yellow, Yellow    Appearance Urine Clear Clear    Glucose Urine Negative Negative mg/dL    Bilirubin Urine Negative Negative    Ketones Urine Negative Negative mg/dL    Specific Gravity Urine 1.020 1.003 - 1.035    Blood Urine Trace (A) Negative    pH Urine 6.0 5.0 - 7.0    Protein Albumin Urine Trace (A) Negative mg/dL    Urobilinogen Urine 0.2 0.2, 1.0 E.U./dL    Nitrite Urine Negative Negative    Leukocyte Esterase Urine Moderate (A) Negative   Urine Microscopic Exam     Status: Abnormal   Result Value Ref Range    Bacteria Urine Few (A) None Seen /HPF    RBC Urine 2-5 (A) 0-2 /HPF /HPF    WBC Urine  (A) 0-5 /HPF /HPF    Squamous Epithelials Urine Few (A) None Seen /LPF   Urine Culture     Status: None    Specimen: Urine, Midstream   Result Value Ref Range    Culture No Growth        ASSESSMENT:   (N30.01) Acute cystitis with hematuria  (primary encounter diagnosis)  Plan: ciprofloxacin (CIPRO) 500 MG tablet,         fluconazole (DIFLUCAN) 150 MG tablet       (R30.0) Dysuria  Plan: UA macro with reflex to Microscopic and Culture        - Clinc Collect, Urine Microscopic Exam, Urine         Culture      Red flags and emergent follow up discussed,  and understood by patient  Follow up with PCP if symptoms worsen or fail to improve      Patient Instructions     Patient Education     Understanding Urinary Tract Infections (UTIs)   Most UTIs are caused by bacteria, but they may also be caused by viruses or fungi. Bacteria from the bowel are the most common source of infection. The infection may start because of any of the following:     Sexual activity.  During sex, bacteria can travel from the penis, vagina, or rectum into the urethra.     Bacteria outside the rectum getting into the urethra.  Bacteria on the skin outside the rectum may travel into the urethra. This is more common in women since the rectum and urethra are closer to each other than in men. Wiping from front to back after using the toilet and keeping the area clean can help prevent germs from getting to the urethra.    Blocked urine flow through the urinary tract. If urine sits too long, germs may start to grow out of control.  Parts of the urinary tract  The infection can occur in any part of the urinary tract.       The kidneys. These organs collect and store urine.    The ureters. These tubes carry urine from the kidneys to the bladder.    The bladder. This holds urine until you are ready to let it out.    The urethra. This tube carries urine from the bladder out of the body. It is shorter in women, so bacteria can move through it more easily. The urethra is longer in men, so a UTI is less likely to reach the bladder or kidneys in men.  [employer] last reviewed this educational content on 1/1/2020 2000-2021 [employer]. All rights reserved. This information is not intended as a substitute for professional medical care. Always follow your healthcare professional's instructions.

## 2022-08-24 ENCOUNTER — OFFICE VISIT (OUTPATIENT)
Dept: URGENT CARE | Facility: URGENT CARE | Age: 37
End: 2022-08-24
Payer: COMMERCIAL

## 2022-08-24 VITALS
HEART RATE: 96 BPM | DIASTOLIC BLOOD PRESSURE: 58 MMHG | RESPIRATION RATE: 16 BRPM | TEMPERATURE: 98.5 F | SYSTOLIC BLOOD PRESSURE: 120 MMHG | OXYGEN SATURATION: 99 %

## 2022-08-24 DIAGNOSIS — R30.0 DYSURIA: Primary | ICD-10-CM

## 2022-08-24 DIAGNOSIS — N39.0 URINARY TRACT INFECTION ASSOCIATED WITH CATHETERIZATION OF URINARY TRACT, UNSPECIFIED INDWELLING URINARY CATHETER TYPE, SUBSEQUENT ENCOUNTER: ICD-10-CM

## 2022-08-24 DIAGNOSIS — T83.511D URINARY TRACT INFECTION ASSOCIATED WITH CATHETERIZATION OF URINARY TRACT, UNSPECIFIED INDWELLING URINARY CATHETER TYPE, SUBSEQUENT ENCOUNTER: ICD-10-CM

## 2022-08-24 LAB
ALBUMIN UR-MCNC: ABNORMAL MG/DL
APPEARANCE UR: CLEAR
BACTERIA #/AREA URNS HPF: ABNORMAL /HPF
BILIRUB UR QL STRIP: NEGATIVE
COLOR UR AUTO: YELLOW
GLUCOSE UR STRIP-MCNC: NEGATIVE MG/DL
HGB UR QL STRIP: NEGATIVE
KETONES UR STRIP-MCNC: NEGATIVE MG/DL
LEUKOCYTE ESTERASE UR QL STRIP: ABNORMAL
NITRATE UR QL: POSITIVE
PH UR STRIP: 6 [PH] (ref 5–7)
RBC #/AREA URNS AUTO: ABNORMAL /HPF
SP GR UR STRIP: 1.01 (ref 1–1.03)
SQUAMOUS #/AREA URNS AUTO: ABNORMAL /LPF
UROBILINOGEN UR STRIP-ACNC: 0.2 E.U./DL
WBC #/AREA URNS AUTO: ABNORMAL /HPF
WBC CLUMPS #/AREA URNS HPF: PRESENT /HPF

## 2022-08-24 PROCEDURE — 87186 SC STD MICRODIL/AGAR DIL: CPT | Performed by: NURSE PRACTITIONER

## 2022-08-24 PROCEDURE — 81001 URINALYSIS AUTO W/SCOPE: CPT

## 2022-08-24 PROCEDURE — 99213 OFFICE O/P EST LOW 20 MIN: CPT | Performed by: NURSE PRACTITIONER

## 2022-08-24 PROCEDURE — 87086 URINE CULTURE/COLONY COUNT: CPT | Performed by: NURSE PRACTITIONER

## 2022-08-24 RX ORDER — CEPHALEXIN 500 MG/1
500 CAPSULE ORAL 2 TIMES DAILY
Qty: 14 CAPSULE | Refills: 0 | Status: SHIPPED | OUTPATIENT
Start: 2022-08-24

## 2022-08-25 NOTE — PROGRESS NOTES
Assessment & Plan   Urinary tract infection associated with catheterization of urinary tract, unspecified indwelling urinary catheter type, subsequent encounter  Dysuria  As per HPI - recurrent due to self-cathing  - UA macro with reflex to Microscopic and Culture - Clinc Collect - Abnormal  - Urine Microscopic Exam  - Urine Culture - Pending  - Education re:extremely good perineal care prior to cathing given E Coli as cause for UTIs recently  - cephALEXin (KEFLEX) 500 MG capsule  Dispense: 14 capsule; Refill: 0     I spent a total of 20 minutes on the day of the visit.   Time spent doing chart review, history and exam, documentation and further activities per the note    Return if symptoms worsen or fail to improve.    NATHAN Cage CNP  [facility] Health[facility] Conner is a 37 year old female who presents to clinic today for the following health issues:  Chief Complaint   Patient presents with     UTI     Pt states she is having urinary frequency, urinary urgency, flank pain that started today      UTI    Onset of symptoms was today  Course of illness is worsening  Severity severe  Current and associated symptoms dysuria, suprapubic pain and pressure, back pain and chills  Treatment and measures tried None  Predisposing factors include history of frequent UTI's and self-cath  Patient denies rigors, temperature > 101 degrees F., vomiting, taking Coumadin, GFR less than 30 within the last year, vaginal discharge, vaginal odor and vaginal itching      Constitutional, HEENT, cardiovascular, pulmonary, gi and gu systems are negative, except as otherwise noted.      Objective    /58   Pulse 96   Temp 98.5  F (36.9  C) (Tympanic)   Resp 16   SpO2 99%      GENERAL: healthy, alert and no distress  NECK: no adenopathy, no asymmetry, masses, or scars and thyroid normal to palpation  RESP: lungs clear to auscultation - no rales, rhonchi or wheezes  CV: regular rate and  rhythm, normal S1 S2, no S3 or S4, no murmur, click or rub, no peripheral edema and peripheral pulses strong  ABDOMEN: soft, nontender, no hepatosplenomegaly, no masses and bowel sounds normal  MS: no gross musculoskeletal defects noted, no edema    Office Visit on 03/09/2022   Component Date Value Ref Range Status     Color Urine 03/09/2022 Yellow  Colorless, Straw, Light Yellow, Yellow Final     Appearance Urine 03/09/2022 Clear  Clear Final     Glucose Urine 03/09/2022 Negative  Negative mg/dL Final     Bilirubin Urine 03/09/2022 Negative  Negative Final     Ketones Urine 03/09/2022 Negative  Negative mg/dL Final     Specific Gravity Urine 03/09/2022 1.020  1.003 - 1.035 Final     Blood Urine 03/09/2022 Trace (A) Negative Final     pH Urine 03/09/2022 6.0  5.0 - 7.0 Final     Protein Albumin Urine 03/09/2022 Trace (A) Negative mg/dL Final     Urobilinogen Urine 03/09/2022 0.2  0.2, 1.0 E.U./dL Final     Nitrite Urine 03/09/2022 Negative  Negative Final     Leukocyte Esterase Urine 03/09/2022 Moderate (A) Negative Final     Bacteria Urine 03/09/2022 Few (A) None Seen /HPF Final     RBC Urine 03/09/2022 2-5 (A) 0-2 /HPF /HPF Final     WBC Urine 03/09/2022  (A) 0-5 /HPF /HPF Final     Squamous Epithelials Urine 03/09/2022 Few (A) None Seen /LPF Final     Culture 03/09/2022 No Growth   Final

## 2022-08-27 LAB — BACTERIA UR CULT: ABNORMAL

## 2022-09-10 ENCOUNTER — HEALTH MAINTENANCE LETTER (OUTPATIENT)
Age: 37
End: 2022-09-10

## 2023-07-17 ENCOUNTER — OFFICE VISIT (OUTPATIENT)
Dept: URGENT CARE | Facility: URGENT CARE | Age: 38
End: 2023-07-17
Payer: COMMERCIAL

## 2023-07-17 VITALS
SYSTOLIC BLOOD PRESSURE: 128 MMHG | DIASTOLIC BLOOD PRESSURE: 70 MMHG | RESPIRATION RATE: 16 BRPM | OXYGEN SATURATION: 99 % | HEART RATE: 79 BPM | TEMPERATURE: 99.1 F

## 2023-07-17 DIAGNOSIS — Z86.2 HISTORY OF ANEMIA: ICD-10-CM

## 2023-07-17 DIAGNOSIS — N39.0 URINARY TRACT INFECTION WITHOUT HEMATURIA, SITE UNSPECIFIED: ICD-10-CM

## 2023-07-17 DIAGNOSIS — R30.0 DYSURIA: Primary | ICD-10-CM

## 2023-07-17 LAB
ALBUMIN UR-MCNC: NEGATIVE MG/DL
APPEARANCE UR: CLEAR
BACTERIA #/AREA URNS HPF: ABNORMAL /HPF
BILIRUB UR QL STRIP: NEGATIVE
COLOR UR AUTO: YELLOW
DACRYOCYTES BLD QL SMEAR: SLIGHT
ERYTHROCYTE [DISTWIDTH] IN BLOOD BY AUTOMATED COUNT: ABNORMAL %
FRAGMENTS BLD QL SMEAR: SLIGHT
GLUCOSE UR STRIP-MCNC: NEGATIVE MG/DL
HCT VFR BLD AUTO: 31.6 % (ref 35–47)
HGB BLD-MCNC: 8.6 G/DL (ref 11.7–15.7)
HGB UR QL STRIP: NEGATIVE
KETONES UR STRIP-MCNC: NEGATIVE MG/DL
LEUKOCYTE ESTERASE UR QL STRIP: ABNORMAL
MCH RBC QN AUTO: 21.5 PG (ref 26.5–33)
MCHC RBC AUTO-ENTMCNC: 27.2 G/DL (ref 31.5–36.5)
MCV RBC AUTO: 79 FL (ref 78–100)
NITRATE UR QL: NEGATIVE
PH UR STRIP: 6 [PH] (ref 5–7)
PLAT MORPH BLD: ABNORMAL
PLATELET # BLD AUTO: 282 10E3/UL (ref 150–450)
RBC # BLD AUTO: 4 10E6/UL (ref 3.8–5.2)
RBC #/AREA URNS AUTO: ABNORMAL /HPF
RBC MORPH BLD: ABNORMAL
SP GR UR STRIP: <=1.005 (ref 1–1.03)
SQUAMOUS #/AREA URNS AUTO: ABNORMAL /LPF
UROBILINOGEN UR STRIP-ACNC: 0.2 E.U./DL
WBC # BLD AUTO: 5.6 10E3/UL (ref 4–11)
WBC #/AREA URNS AUTO: ABNORMAL /HPF
WBC CLUMPS #/AREA URNS HPF: PRESENT /HPF

## 2023-07-17 PROCEDURE — 81001 URINALYSIS AUTO W/SCOPE: CPT | Performed by: PHYSICIAN ASSISTANT

## 2023-07-17 PROCEDURE — 99214 OFFICE O/P EST MOD 30 MIN: CPT | Performed by: PHYSICIAN ASSISTANT

## 2023-07-17 PROCEDURE — 87086 URINE CULTURE/COLONY COUNT: CPT | Performed by: PHYSICIAN ASSISTANT

## 2023-07-17 PROCEDURE — 85027 COMPLETE CBC AUTOMATED: CPT | Performed by: PHYSICIAN ASSISTANT

## 2023-07-17 PROCEDURE — 36415 COLL VENOUS BLD VENIPUNCTURE: CPT | Performed by: PHYSICIAN ASSISTANT

## 2023-07-17 RX ORDER — CEFDINIR 300 MG/1
300 CAPSULE ORAL 2 TIMES DAILY
Qty: 20 CAPSULE | Refills: 0 | Status: SHIPPED | OUTPATIENT
Start: 2023-07-17 | End: 2023-07-27

## 2023-07-17 NOTE — PROGRESS NOTES
Assessment & Plan     Dysuria    UA POS  Urine culture pending  Increase oral fluids    - UA Macroscopic with reflex to Microscopic and Culture - Clinic Collect  - Urine Microscopic Exam  - Urine Culture  - cefdinir (OMNICEF) 300 MG capsule; Take 1 capsule (300 mg) by mouth 2 times daily for 10 days    Urinary tract infection without hematuria, site unspecified    Bladder infections are not contagious. You can't get one from someone else, from a toilet seat, or from sharing a bath.  The most common cause of bladder infections is bacteria from the bowels. The bacteria get onto the skin around the opening of the urethra. From there, they can get into the urine. Then they travel up to the bladder, causing inflammation and infection. This often happens because of:    Wiping incorrectly after urinating. Always wipe from front to back.    Bowel incontinence    Pregnancy    Procedures such as having a catheter put in    Older age    Not emptying your bladder. This can give bacteria a chance to grow in your urine.    Fluid loss (dehydration)    Constipation    Having sex    Using a diaphragm for birth control   Treatment  Bladder infections are diagnosed by a urine test and urine culture. They are treated with antibiotics. They often clear up quickly without problems. Treatment helps prevent a more serious kidney infection.  Medicines  Medicines can help in the treatment of a bladder infection:    Take antibiotics until they are used up, even if you feel better. It's important to finish them to make sure the infection has cleared.    You can use acetaminophen or ibuprofen for pain, fever, or discomfort, unless another medicine was prescribed. If you have long-term (chronic) liver or kidney disease, talk with your healthcare provider before using these medicines. Also talk with your provider if you've ever had a stomach ulcer or GI (gastrointestinal) bleeding, or are taking blood-thinner medicines.    If you are given  phenazopydridine to reduce burning with urination, it will make your urine a bright orange color. This can stain clothing.  Care and prevention  These self-care steps can help prevent future infections:    Drink plenty of fluids. This helps to prevent dehydration and flush out your bladder. Do this unless you must restrict fluids for other health reasons, or your healthcare provider told you not to.    Clean yourself correctly after going to the bathroom. Wipe from front to back after using the toilet. This helps prevent the spread of bacteria.    Urinate more often. Don't try to hold urine in for a long time.    Wear loose-fitting clothes and cotton underwear. Don't wear tight-fitting pants.    Improve your diet and prevent constipation. Eat more fresh fruits and vegetables, and fiber. Eat less junk foods and fatty foods.    Don't have sex until your symptoms are gone.    Don't have caffeine, alcohol, and spicy foods. These can irritate your bladder.    Urinate right after you have sex to flush out your bladder.    - cefdinir (OMNICEF) 300 MG capsule; Take 1 capsule (300 mg) by mouth 2 times daily for 10 days    History of anemia    CBC shows Hgb of 8.8  Patients Hgb has improved   Follow up with PCP/Hematology  - CBC with platelets; Future  - CBC with platelets    Review of external notes as documented elsewhere in note      Nicotine/Tobacco Cessation:  She reports that she has been smoking cigarettes. She has been smoking an average of .5 packs per day. She has never used smokeless tobacco.  Nicotine/Tobacco Cessation Plan:     At today's visit with Dalila Rosario , we discussed results, diagnosis, medications and formulated a plan.  We also discussed red flags for immediate return to clinic/ER, as well as indications for follow up with PCP if not improved in 3 days. Patient understood and agreed to plan. Dalila GUTIERREZ Rosario was discharged with stable vitals and has no further questions.       No follow-ups  on file.    Ez Hearn, [facility], PA-C  [facility] CANDELARIA Conner is a 38 year old, presenting for the following health issues:  UTI (Wants to check for UTI and hemoglobin )         No data to display              HPI   Review of Systems   Constitutional, HEENT, cardiovascular, pulmonary, gi and gu systems are negative, except as otherwise noted.      Objective    /70   Pulse 79   Temp 99.1  F (37.3  C) (Tympanic)   Resp 16   SpO2 99%   There is no height or weight on file to calculate BMI.  Physical Exam   GENERAL: healthy, alert and no distress  ABDOMEN: soft, nontender, no hepatosplenomegaly, no masses and bowel sounds normal  MS: no gross musculoskeletal defects noted, no edema  SKIN: no suspicious lesions or rashes  NEURO: Normal strength and tone, mentation intact and speech normal  PSYCH: mentation appears normal, affect normal/bright        Results for orders placed or performed in visit on 07/17/23   UA Macroscopic with reflex to Microscopic and Culture - Clinic Collect     Status: Abnormal    Specimen: Urine, Midstream   Result Value Ref Range    Color Urine Yellow Colorless, Straw, Light Yellow, Yellow    Appearance Urine Clear Clear    Glucose Urine Negative Negative mg/dL    Bilirubin Urine Negative Negative    Ketones Urine Negative Negative mg/dL    Specific Gravity Urine <=1.005 1.003 - 1.035    Blood Urine Negative Negative    pH Urine 6.0 5.0 - 7.0    Protein Albumin Urine Negative Negative mg/dL    Urobilinogen Urine 0.2 0.2, 1.0 E.U./dL    Nitrite Urine Negative Negative    Leukocyte Esterase Urine Large (A) Negative   Urine Microscopic Exam     Status: Abnormal   Result Value Ref Range    Bacteria Urine Few (A) None Seen /HPF    RBC Urine 2-5 (A) 0-2 /HPF /HPF    WBC Urine 25-50 (A) 0-5 /HPF /HPF    Squamous Epithelials Urine Few (A) None Seen /LPF    WBC Clumps Urine Present (A) None Seen /HPF

## 2023-07-19 LAB — BACTERIA UR CULT: NO GROWTH

## 2023-07-23 ENCOUNTER — HEALTH MAINTENANCE LETTER (OUTPATIENT)
Age: 38
End: 2023-07-23

## 2023-11-18 ENCOUNTER — OFFICE VISIT (OUTPATIENT)
Dept: URGENT CARE | Facility: URGENT CARE | Age: 38
End: 2023-11-18
Payer: COMMERCIAL

## 2023-11-18 VITALS
DIASTOLIC BLOOD PRESSURE: 78 MMHG | HEART RATE: 97 BPM | SYSTOLIC BLOOD PRESSURE: 112 MMHG | TEMPERATURE: 98.4 F | BODY MASS INDEX: 32.54 KG/M2 | WEIGHT: 130 LBS | RESPIRATION RATE: 16 BRPM | OXYGEN SATURATION: 98 %

## 2023-11-18 DIAGNOSIS — B96.89 BACTERIAL VAGINOSIS: ICD-10-CM

## 2023-11-18 DIAGNOSIS — N76.0 BACTERIAL VAGINOSIS: ICD-10-CM

## 2023-11-18 DIAGNOSIS — N39.0 ACUTE UTI: Primary | ICD-10-CM

## 2023-11-18 DIAGNOSIS — R30.0 DYSURIA: ICD-10-CM

## 2023-11-18 LAB
ALBUMIN UR-MCNC: 100 MG/DL
APPEARANCE UR: CLEAR
BACTERIA #/AREA URNS HPF: ABNORMAL /HPF
BILIRUB UR QL STRIP: NEGATIVE
CLUE CELLS: PRESENT
COLOR UR AUTO: YELLOW
GLUCOSE UR STRIP-MCNC: NEGATIVE MG/DL
HGB UR QL STRIP: ABNORMAL
KETONES UR STRIP-MCNC: NEGATIVE MG/DL
LEUKOCYTE ESTERASE UR QL STRIP: ABNORMAL
NITRATE UR QL: POSITIVE
PH UR STRIP: 6 [PH] (ref 5–7)
RBC #/AREA URNS AUTO: ABNORMAL /HPF
SP GR UR STRIP: 1.01 (ref 1–1.03)
SQUAMOUS #/AREA URNS AUTO: ABNORMAL /LPF
TRICHOMONAS, WET PREP: ABNORMAL
UROBILINOGEN UR STRIP-ACNC: 0.2 E.U./DL
WBC #/AREA URNS AUTO: ABNORMAL /HPF
WBC'S/HIGH POWER FIELD, WET PREP: ABNORMAL
YEAST, WET PREP: ABNORMAL

## 2023-11-18 PROCEDURE — 87186 SC STD MICRODIL/AGAR DIL: CPT | Performed by: PHYSICIAN ASSISTANT

## 2023-11-18 PROCEDURE — 99214 OFFICE O/P EST MOD 30 MIN: CPT | Performed by: PHYSICIAN ASSISTANT

## 2023-11-18 PROCEDURE — 81001 URINALYSIS AUTO W/SCOPE: CPT

## 2023-11-18 PROCEDURE — 87086 URINE CULTURE/COLONY COUNT: CPT | Performed by: PHYSICIAN ASSISTANT

## 2023-11-18 PROCEDURE — 87210 SMEAR WET MOUNT SALINE/INK: CPT

## 2023-11-18 RX ORDER — ONDANSETRON 4 MG/1
4 TABLET, ORALLY DISINTEGRATING ORAL EVERY 8 HOURS PRN
Qty: 10 TABLET | Refills: 0 | Status: SHIPPED | OUTPATIENT
Start: 2023-11-18

## 2023-11-18 RX ORDER — METRONIDAZOLE 500 MG/1
500 TABLET ORAL 2 TIMES DAILY
Qty: 14 TABLET | Refills: 0 | Status: SHIPPED | OUTPATIENT
Start: 2023-11-18 | End: 2023-11-25

## 2023-11-18 RX ORDER — FLUCONAZOLE 150 MG/1
150 TABLET ORAL ONCE
Qty: 1 TABLET | Refills: 0 | Status: SHIPPED | OUTPATIENT
Start: 2023-11-18 | End: 2023-11-18

## 2023-11-18 RX ORDER — SULFAMETHOXAZOLE/TRIMETHOPRIM 800-160 MG
1 TABLET ORAL 2 TIMES DAILY
Qty: 20 TABLET | Refills: 0 | Status: SHIPPED | OUTPATIENT
Start: 2023-11-18 | End: 2023-11-28

## 2023-11-18 ASSESSMENT — PAIN SCALES - GENERAL: PAINLEVEL: MODERATE PAIN (5)

## 2023-11-18 NOTE — PROGRESS NOTES
Patient presents with:  UTI: Thursday, started with frequency, and now today flank/kidney pains. Self caths to empty bladder.     (N39.0) Acute UTI  (primary encounter diagnosis)  Comment:   Plan: sulfamethoxazole-trimethoprim (BACTRIM DS)         800-160 MG tablet, ondansetron (ZOFRAN ODT) 4         MG ODT tab          Zofran for the nausea associated with Bactrim.      Urine culture pending, we will contact you if the sensitivities reveal a need to change the antibiotic.      (R30.0) Dysuria  Comment:   Plan: UA Macroscopic with reflex to Microscopic and         Culture - Clinic Collect, Wet prep - Clinic         Collect, Urine Microscopic Exam, Urine Culture,        fluconazole (DIFLUCAN) 150 MG tablet, CANCELED:        Chlamydia trachomatis PCR, CANCELED: Neisseria         gonorrhoeae PCR            (N76.0,  B96.89) Bacterial vaginosis  Comment:   Plan: metroNIDAZOLE (FLAGYL) 500 MG tablet            TO ED should you develop fever and worsening kidney discomfort      At the end of the encounter, I discussed results, diagnosis, medications. Discussed red flags for immediate return to clinic/ER, as well as indications for follow up if no improvement. Patient understood and agreed to plan. Patient was stable for discharge             SUBJECTIVE:   Dalila Rosario is a 38 year old female who complains of urinary frequency onset 11/16.  Low grade fever this morning.  Some left sided flank discomfort today.  Self caths.      Here with her significant other today.    Does tend to develop nausea with some antibiotics, requests Rx for Zofran.       Patient Active Problem List   Diagnosis    Injury of knee, left, superficial, infected    MDD (major depressive disorder)    Suicidal ideation    Hand joint pain    Anxiety    Hyperlipidemia with target LDL less than 130    Insomnia    Metrorrhagia    Neurogenic bladder    Scoliosis (and kyphoscoliosis), idiopathic    Hypertension goal BP (blood pressure) < 140/90     Chemical dependency (in remission 5 months)    S/P total knee arthroplasty    Quadriceps tendon rupture, unspecified laterality, initial encounter    Overdose of drug    Suicide attempt (H)    Bacterial infection of knee joint (H)    Non-healing surgical wound, initial encounter    History of left above knee amputation (H)    Polysubstance overdose, intentional self-harm, initial encounter (H)    Depression, unspecified depression type    Generalized anxiety disorder         Past Medical History:   Diagnosis Date    Anxiety     Attempted suicide (H)     History of blood transfusion     Major depression     Myelodysplasia of the spinal cord (H)     OCD (obsessive compulsive disorder)     Other chronic pain          Current Outpatient Medications   Medication Sig Dispense Refill    Multiple Vitamins-Iron (DAILY-RHODA/IRON/BETA-CAROTENE) TABS TAKE 1 TABLET BY MOUTH DAILY. (Patient not taking: Reported on 10/19/2020) 30 tablet 7     Social History     Tobacco Use    Smoking status: Never Smoker    Smokeless tobacco: Never Used   Substance Use Topics    Alcohol use: Not on file     Family History   Problem Relation Age of Onset    Diabetes Mother     Diabetes Father          ROS:    10 point ROS of systems including Constitutional, Eyes, Respiratory, Cardiovascular, Gastroenterology, Genitourinary, Integumentary, Muscularskeletal, Psychiatric ,neurological were all negative except for pertinent positives noted in my HPI       OBJECTIVE:  /78 (BP Location: Left arm, Patient Position: Sitting, Cuff Size: Adult Regular)   Pulse 97   Temp 98.4  F (36.9  C) (Tympanic)   Resp 16   Wt 59 kg (130 lb)   SpO2 98%   BMI 32.54 kg/m    Physical Exam:  GENERAL APPEARANCE: healthy, alert and no distress  ABDOMEN:  soft, nontender, no HSM or masses and bowel sounds normal  SKIN: no suspicious lesions or rashes  Back: mild CVAT on left.      Results for orders placed or performed in visit on 11/18/23   UA Macroscopic with  reflex to Microscopic and Culture - Clinic Collect     Status: Abnormal    Specimen: Urine, Midstream   Result Value Ref Range    Color Urine Yellow Colorless, Straw, Light Yellow, Yellow    Appearance Urine Clear Clear    Glucose Urine Negative Negative mg/dL    Bilirubin Urine Negative Negative    Ketones Urine Negative Negative mg/dL    Specific Gravity Urine 1.015 1.003 - 1.035    Blood Urine Large (A) Negative    pH Urine 6.0 5.0 - 7.0    Protein Albumin Urine 100 (A) Negative mg/dL    Urobilinogen Urine 0.2 0.2, 1.0 E.U./dL    Nitrite Urine Positive (A) Negative    Leukocyte Esterase Urine Large (A) Negative   Urine Microscopic Exam     Status: Abnormal   Result Value Ref Range    Bacteria Urine Many (A) None Seen /HPF    RBC Urine 2-5 (A) 0-2 /HPF /HPF    WBC Urine 25-50 (A) 0-5 /HPF /HPF    Squamous Epithelials Urine Few (A) None Seen /LPF   Wet prep - Clinic Collect     Status: Abnormal    Specimen: Vagina; Swab   Result Value Ref Range    Trichomonas Absent Absent    Yeast Absent Absent    Clue Cells Present (A) Absent    WBCs/high power field 1+ (A) None

## 2023-11-20 LAB — BACTERIA UR CULT: ABNORMAL

## 2024-01-05 ENCOUNTER — OFFICE VISIT (OUTPATIENT)
Dept: URGENT CARE | Facility: URGENT CARE | Age: 39
End: 2024-01-05
Payer: COMMERCIAL

## 2024-01-05 VITALS
DIASTOLIC BLOOD PRESSURE: 79 MMHG | TEMPERATURE: 98.8 F | RESPIRATION RATE: 14 BRPM | OXYGEN SATURATION: 97 % | SYSTOLIC BLOOD PRESSURE: 127 MMHG | HEART RATE: 88 BPM

## 2024-01-05 DIAGNOSIS — R07.0 THROAT PAIN: ICD-10-CM

## 2024-01-05 DIAGNOSIS — R05.9 COUGH, UNSPECIFIED TYPE: Primary | ICD-10-CM

## 2024-01-05 LAB
DEPRECATED S PYO AG THROAT QL EIA: NEGATIVE
GROUP A STREP BY PCR: NOT DETECTED

## 2024-01-05 PROCEDURE — 87651 STREP A DNA AMP PROBE: CPT | Performed by: FAMILY MEDICINE

## 2024-01-05 PROCEDURE — 99213 OFFICE O/P EST LOW 20 MIN: CPT | Performed by: FAMILY MEDICINE

## 2024-01-05 RX ORDER — BENZONATATE 200 MG/1
200 CAPSULE ORAL 3 TIMES DAILY PRN
Qty: 21 CAPSULE | Refills: 0 | Status: SHIPPED | OUTPATIENT
Start: 2024-01-05 | End: 2024-01-12

## 2024-01-05 NOTE — PROGRESS NOTES
"SUBJECTIVE: Dalila Rosario is a 38 year old female presenting with a chief complaint of \"cold symptoms\" and cough .  Onset of symptoms was 4 day(s) ago.  Current and Associated symptoms: sore throat  Treatment measures tried include Tylenol/Ibuprofen.  Predisposing factors include Noasthma    Past Medical History:   Diagnosis Date    Anxiety     Attempted suicide (H)     History of blood transfusion     Major depression     Myelodysplasia of the spinal cord (H)     OCD (obsessive compulsive disorder)     Other chronic pain      Allergies   Allergen Reactions    Droperidol Anaphylaxis    Lidocaine      Other reaction(s): Seizures - was told by allergist that she is not allergic and had reaction due to quantity of lido that she received.     Benzoin Tincture [Benzoin] Blisters    Vancomycin Other (See Comments)     Other reaction(s): Other (see comments), Chaz Syndrome  Slowed rate down and given benadryl.  Slowed rate down and given benadryl.  Slowed rate down and given benadryl.  Slowed rate down and given benadryl.       Social History     Tobacco Use    Smoking status: Some Days     Packs/day: .5     Types: Cigarettes    Smokeless tobacco: Never   Substance Use Topics    Alcohol use: Yes     Alcohol/week: 0.0 standard drinks of alcohol     Comment: not often       ROS:  SKIN: no rash  GI: no vomiting    OBJECTIVE:  /79   Pulse 88   Temp 98.8  F (37.1  C) (Tympanic)   Resp 14   SpO2 97% GENERAL APPEARANCE: healthy, alert and no distress  EYES: EOMI,  PERRL, conjunctiva clear  HENT: ear canals and TM's normal.  Nose and mouth without ulcers, erythema or lesions  RESP: lungs clear to auscultation - no rales, rhonchi or wheezes  SKIN: no suspicious lesions or rashes      ICD-10-CM    1. Cough, unspecified type  R05.9 benzonatate (TESSALON) 200 MG capsule      2. Throat pain  R07.0 Streptococcus A Rapid Screen w/Reflex to PCR - Clinic Collect     Group A Streptococcus PCR Throat Swab    "       Fluids/Rest, f/u if worse/not any better

## 2024-04-28 ENCOUNTER — HEALTH MAINTENANCE LETTER (OUTPATIENT)
Age: 39
End: 2024-04-28

## 2024-05-14 ENCOUNTER — OFFICE VISIT (OUTPATIENT)
Dept: URGENT CARE | Facility: URGENT CARE | Age: 39
End: 2024-05-14
Payer: COMMERCIAL

## 2024-05-14 DIAGNOSIS — N39.0 URINARY TRACT INFECTION WITH HEMATURIA, SITE UNSPECIFIED: ICD-10-CM

## 2024-05-14 DIAGNOSIS — R11.0 NAUSEA: ICD-10-CM

## 2024-05-14 DIAGNOSIS — R35.0 URINARY FREQUENCY: Primary | ICD-10-CM

## 2024-05-14 DIAGNOSIS — N10 PYELONEPHRITIS, ACUTE: ICD-10-CM

## 2024-05-14 DIAGNOSIS — R31.9 URINARY TRACT INFECTION WITH HEMATURIA, SITE UNSPECIFIED: ICD-10-CM

## 2024-05-14 LAB
ALBUMIN UR-MCNC: 30 MG/DL
APPEARANCE UR: CLEAR
BACTERIA #/AREA URNS HPF: ABNORMAL /HPF
BILIRUB UR QL STRIP: NEGATIVE
COLOR UR AUTO: YELLOW
GLUCOSE UR STRIP-MCNC: NEGATIVE MG/DL
HGB UR QL STRIP: ABNORMAL
KETONES UR STRIP-MCNC: NEGATIVE MG/DL
LEUKOCYTE ESTERASE UR QL STRIP: ABNORMAL
NITRATE UR QL: POSITIVE
PH UR STRIP: 6 [PH] (ref 5–7)
RBC #/AREA URNS AUTO: ABNORMAL /HPF
SP GR UR STRIP: 1.01 (ref 1–1.03)
SQUAMOUS #/AREA URNS AUTO: ABNORMAL /LPF
UROBILINOGEN UR STRIP-ACNC: 0.2 E.U./DL
WBC #/AREA URNS AUTO: >100 /HPF

## 2024-05-14 PROCEDURE — 87186 SC STD MICRODIL/AGAR DIL: CPT | Performed by: PHYSICIAN ASSISTANT

## 2024-05-14 PROCEDURE — 87086 URINE CULTURE/COLONY COUNT: CPT | Performed by: PHYSICIAN ASSISTANT

## 2024-05-14 PROCEDURE — 81001 URINALYSIS AUTO W/SCOPE: CPT | Performed by: PHYSICIAN ASSISTANT

## 2024-05-14 PROCEDURE — 99214 OFFICE O/P EST MOD 30 MIN: CPT | Performed by: PHYSICIAN ASSISTANT

## 2024-05-14 RX ORDER — CIPROFLOXACIN 500 MG/1
500 TABLET, FILM COATED ORAL 2 TIMES DAILY
Qty: 20 TABLET | Refills: 0 | Status: SHIPPED | OUTPATIENT
Start: 2024-05-14 | End: 2024-05-24

## 2024-05-14 RX ORDER — ONDANSETRON 4 MG/1
4 TABLET, ORALLY DISINTEGRATING ORAL EVERY 8 HOURS PRN
Qty: 10 TABLET | Refills: 0 | Status: SHIPPED | OUTPATIENT
Start: 2024-05-14

## 2024-05-14 NOTE — PROGRESS NOTES
Assessment & Plan     Urinary frequency    UA is POS  Patient self caths and has hx of kidney infections    - UA Macroscopic with reflex to Microscopic and Culture - Clinic Collect  - Urine Microscopic Exam  - Urine Culture    Pyelonephritis, acute    A kidney infection (pyelonephritis) is a type of urinary tract infection, or UTI. Most UTIs are bladder infections. Kidney infections tend to make people much sicker than bladder infections do. A kidney infection is also more serious because it can cause lasting damage if it is not treated quickly.     - ciprofloxacin (CIPRO) 500 MG tablet; Take 1 tablet (500 mg) by mouth 2 times daily for 10 days    UA is POS  Urine culture is pending  Start on cipro  Increase oral fluids      Nausea    Patient has some nausea  This occurs when she has kidney infections  - ondansetron (ZOFRAN ODT) 4 MG ODT tab; Take 1 tablet (4 mg) by mouth every 8 hours as needed for nausea    Review of external notes as documented elsewhere in note      At today's visit with Dalila Rosario , we discussed results, diagnosis, medications and formulated a plan.  We also discussed red flags for immediate return to clinic/ER, as well as indications for follow up with PCP if not improved in 3 days. Patient understood and agreed to plan. Dalila Rosario was discharged with stable vitals and has no further questions.       No follow-ups on file.    Subjective   Dalila is a 39 year old, presenting for the following health issues:  UTI (Onset: 5/11/2024; Frequency, cloudy urine, no pain when voiding. )    HPI     Review of Systems  Constitutional, neuro, ENT, endocrine, pulmonary, cardiac, gastrointestinal, genitourinary, musculoskeletal, integument and psychiatric systems are negative, except as otherwise noted.      Objective    There were no vitals taken for this visit.  There is no height or weight on file to calculate BMI.  Physical Exam   GENERAL: alert and no distress  ABDOMEN: soft,  nontender, no hepatosplenomegaly, no masses and bowel sounds normal  MS: Pos for left flank tenderness  SKIN: no suspicious lesions or rashes  NEURO: Normal strength and tone, mentation intact and speech normal  PSYCH: mentation appears normal, affect normal/bright      Results for orders placed or performed in visit on 05/14/24   UA Macroscopic with reflex to Microscopic and Culture - Clinic Collect     Status: Abnormal    Specimen: Urine, Clean Catch   Result Value Ref Range    Color Urine Yellow Colorless, Straw, Light Yellow, Yellow    Appearance Urine Clear Clear    Glucose Urine Negative Negative mg/dL    Bilirubin Urine Negative Negative    Ketones Urine Negative Negative mg/dL    Specific Gravity Urine 1.010 1.003 - 1.035    Blood Urine Moderate (A) Negative    pH Urine 6.0 5.0 - 7.0    Protein Albumin Urine 30 (A) Negative mg/dL    Urobilinogen Urine 0.2 0.2, 1.0 E.U./dL    Nitrite Urine Positive (A) Negative    Leukocyte Esterase Urine Large (A) Negative   Urine Microscopic Exam     Status: Abnormal   Result Value Ref Range    Bacteria Urine Many (A) None Seen /HPF    RBC Urine  (A) 0-2 /HPF /HPF    WBC Urine >100 (A) 0-5 /HPF /HPF    Squamous Epithelials Urine Moderate (A) None Seen /LPF             Signed Electronically by: Ez Hearn, MMS, PAWoodyC

## 2024-05-15 LAB — BACTERIA UR CULT: ABNORMAL

## 2024-05-16 RX ORDER — CEFDINIR 300 MG/1
300 CAPSULE ORAL 2 TIMES DAILY
Qty: 20 CAPSULE | Refills: 0 | Status: SHIPPED | OUTPATIENT
Start: 2024-05-16 | End: 2024-05-26

## 2024-09-04 ENCOUNTER — OFFICE VISIT (OUTPATIENT)
Dept: URGENT CARE | Facility: URGENT CARE | Age: 39
End: 2024-09-04
Payer: COMMERCIAL

## 2024-09-04 VITALS
HEART RATE: 97 BPM | DIASTOLIC BLOOD PRESSURE: 65 MMHG | SYSTOLIC BLOOD PRESSURE: 118 MMHG | RESPIRATION RATE: 16 BRPM | TEMPERATURE: 98.4 F | OXYGEN SATURATION: 98 %

## 2024-09-04 DIAGNOSIS — N76.0 BV (BACTERIAL VAGINOSIS): Primary | ICD-10-CM

## 2024-09-04 DIAGNOSIS — B96.89 BV (BACTERIAL VAGINOSIS): Primary | ICD-10-CM

## 2024-09-04 DIAGNOSIS — R30.0 DYSURIA: ICD-10-CM

## 2024-09-04 LAB
ALBUMIN UR-MCNC: ABNORMAL MG/DL
APPEARANCE UR: CLEAR
BACTERIA #/AREA URNS HPF: ABNORMAL /HPF
BILIRUB UR QL STRIP: NEGATIVE
CLUE CELLS: PRESENT
COLOR UR AUTO: YELLOW
GLUCOSE UR STRIP-MCNC: NEGATIVE MG/DL
HGB UR QL STRIP: ABNORMAL
KETONES UR STRIP-MCNC: NEGATIVE MG/DL
LEUKOCYTE ESTERASE UR QL STRIP: ABNORMAL
NITRATE UR QL: NEGATIVE
PH UR STRIP: 6 [PH] (ref 5–7)
RBC #/AREA URNS AUTO: ABNORMAL /HPF
SP GR UR STRIP: 1.01 (ref 1–1.03)
SQUAMOUS #/AREA URNS AUTO: ABNORMAL /LPF
TRICHOMONAS, WET PREP: ABNORMAL
UROBILINOGEN UR STRIP-ACNC: 0.2 E.U./DL
WBC #/AREA URNS AUTO: >100 /HPF
WBC'S/HIGH POWER FIELD, WET PREP: ABNORMAL
YEAST, WET PREP: ABNORMAL

## 2024-09-04 PROCEDURE — 81001 URINALYSIS AUTO W/SCOPE: CPT | Performed by: PHYSICIAN ASSISTANT

## 2024-09-04 PROCEDURE — 87086 URINE CULTURE/COLONY COUNT: CPT | Performed by: PHYSICIAN ASSISTANT

## 2024-09-04 PROCEDURE — 99214 OFFICE O/P EST MOD 30 MIN: CPT | Performed by: PHYSICIAN ASSISTANT

## 2024-09-04 PROCEDURE — 87210 SMEAR WET MOUNT SALINE/INK: CPT | Performed by: PHYSICIAN ASSISTANT

## 2024-09-04 RX ORDER — CEPHALEXIN 500 MG/1
500 CAPSULE ORAL 2 TIMES DAILY
Qty: 14 CAPSULE | Refills: 0 | Status: SHIPPED | OUTPATIENT
Start: 2024-09-04 | End: 2024-09-11

## 2024-09-04 RX ORDER — FLUCONAZOLE 150 MG/1
150 TABLET ORAL
Qty: 2 TABLET | Refills: 0 | Status: SHIPPED | OUTPATIENT
Start: 2024-09-04

## 2024-09-04 RX ORDER — METRONIDAZOLE 500 MG/1
500 TABLET ORAL 2 TIMES DAILY
Qty: 14 TABLET | Refills: 0 | Status: SHIPPED | OUTPATIENT
Start: 2024-09-04 | End: 2024-09-11

## 2024-09-04 NOTE — PROGRESS NOTES
SUBJECTIVE:   Dalila Rosario is a 39 year old female who  presents today for a possible UTI. Symptoms of dysuria, urgency, frequency, and hesitancy have been going on for 5day(s).  Hematuria no.  sudden onset and still presentand mild.  There is no history of fever, chills, nausea or vomiting.  No history of vaginal or penile discharge. This patient does have a history of urinary tract infections. Patient denies long duration, rigors, flank pain, temperature > 101 degrees F., Vomiting, significant nausea or diarrhea, taking Coumadin, and GFR less than 30 within the last year or vaginal discharge, vaginal odor, vaginal itching, and dyspareunia (pain in labia/pelvis)     Past Medical History:   Diagnosis Date    Anxiety     Attempted suicide (H)     History of blood transfusion     Major depression     Myelodysplasia of the spinal cord (H)     OCD (obsessive compulsive disorder)     Other chronic pain      Current Outpatient Medications   Medication Sig Dispense Refill    acetaminophen (TYLENOL) 500 MG tablet Take 2 tablets by mouth      buPROPion 450 MG TB24 Take 450 mg by mouth every morning 30 tablet 0    cephALEXin (KEFLEX) 500 MG capsule Take 1 capsule (500 mg) by mouth 2 times daily for 7 days. 14 capsule 0    cephALEXin (KEFLEX) 500 MG capsule Take 1 capsule (500 mg) by mouth 2 times daily (Patient not taking: Reported on 7/17/2023) 14 capsule 0    fluconazole (DIFLUCAN) 150 MG tablet Take 1 tablet (150 mg) by mouth every 7 days (Patient not taking: Reported on 11/18/2023) 1 tablet 1    gabapentin (NEURONTIN) 400 MG capsule Take 400 mg by mouth 2 times daily      gabapentin (NEURONTIN) 400 MG capsule Take 800 mg by mouth At Bedtime      hydrOXYzine (ATARAX) 25 MG tablet Take 1-2 tablets (25-50 mg) by mouth every 4 hours as needed for other (adjuvant pain) 120 tablet 1    Mirtazapine (REMERON PO) Take 45 mg by mouth At Bedtime       ondansetron (ZOFRAN ODT) 4 MG ODT tab Take 1 tablet (4 mg) by mouth every 8  hours as needed for nausea 10 tablet 0    ondansetron (ZOFRAN ODT) 4 MG ODT tab Take 1 tablet (4 mg) by mouth every 8 hours as needed for nausea (Patient not taking: Reported on 1/5/2024) 10 tablet 0    QUEtiapine (SEROQUEL) 50 MG tablet Take 1-2 tablets ( mg) by mouth At Bedtime 45 tablet 0     Social History     Tobacco Use    Smoking status: Some Days     Current packs/day: 0.50     Types: Cigarettes    Smokeless tobacco: Never   Substance Use Topics    Alcohol use: Yes     Alcohol/week: 0.0 standard drinks of alcohol     Comment: not often       ROS:   Review of systems negative except as stated above.    OBJECTIVE:  /65 (BP Location: Left arm, Patient Position: Sitting, Cuff Size: Adult Regular)   Pulse 97   Temp 98.4  F (36.9  C) (Tympanic)   Resp 16   SpO2 98%   GENERAL APPEARANCE: healthy, alert and no distress  RESP: lungs clear to auscultation - no rales, rhonchi or wheezes  CV: regular rates and rhythm, normal S1 S2, no murmur noted  BACK: No CVA tenderness  SKIN: no suspicious lesions or rashes    Results for orders placed or performed in visit on 09/04/24   UA Macroscopic with reflex to Microscopic and Culture - Clinic Collect     Status: Abnormal    Specimen: Urine, Catheter   Result Value Ref Range    Color Urine Yellow Colorless, Straw, Light Yellow, Yellow    Appearance Urine Clear Clear    Glucose Urine Negative Negative mg/dL    Bilirubin Urine Negative Negative    Ketones Urine Negative Negative mg/dL    Specific Gravity Urine 1.015 1.003 - 1.035    Blood Urine Trace (A) Negative    pH Urine 6.0 5.0 - 7.0    Protein Albumin Urine Trace (A) Negative mg/dL    Urobilinogen Urine 0.2 0.2, 1.0 E.U./dL    Nitrite Urine Negative Negative    Leukocyte Esterase Urine Moderate (A) Negative   Urine Microscopic Exam     Status: Abnormal   Result Value Ref Range    Bacteria Urine Moderate (A) None Seen /HPF    RBC Urine 2-5 (A) 0-2 /HPF /HPF    WBC Urine >100 (A) 0-5 /HPF /HPF    Squamous  Epithelials Urine Few (A) None Seen /LPF   Wet prep - Clinic Collect     Status: Abnormal    Specimen: Vagina; Swab   Result Value Ref Range    Trichomonas Absent Absent    Yeast Absent Absent    Clue Cells Present (A) Absent    WBCs/high power field 2+ (A) None         ASSESSMENT:   (N76.0,  B96.89) BV (bacterial vaginosis)  (primary encounter diagnosis)  Plan: metroNIDAZOLE (FLAGYL) 500 MG tablet       (R30.0) Dysuria  Plan: UA Macroscopic with reflex to Microscopic and         Culture - Clinic Collect, Wet prep - Clinic         Collect, cephALEXin (KEFLEX) 500 MG capsule,         Urine Microscopic Exam, Urine Culture

## 2024-09-06 LAB — BACTERIA UR CULT: NO GROWTH

## 2024-09-15 ENCOUNTER — HEALTH MAINTENANCE LETTER (OUTPATIENT)
Age: 39
End: 2024-09-15

## 2024-11-10 ENCOUNTER — OFFICE VISIT (OUTPATIENT)
Dept: URGENT CARE | Facility: URGENT CARE | Age: 39
End: 2024-11-10
Payer: COMMERCIAL

## 2024-11-10 VITALS
TEMPERATURE: 98.4 F | RESPIRATION RATE: 20 BRPM | DIASTOLIC BLOOD PRESSURE: 80 MMHG | BODY MASS INDEX: 31.29 KG/M2 | SYSTOLIC BLOOD PRESSURE: 109 MMHG | HEART RATE: 87 BPM | OXYGEN SATURATION: 97 % | WEIGHT: 125 LBS

## 2024-11-10 DIAGNOSIS — N10 ACUTE PYELONEPHRITIS: Primary | ICD-10-CM

## 2024-11-10 LAB
ALBUMIN UR-MCNC: >=300 MG/DL
APPEARANCE UR: ABNORMAL
BACTERIA #/AREA URNS HPF: ABNORMAL /HPF
BILIRUB UR QL STRIP: NEGATIVE
COLOR UR AUTO: YELLOW
GLUCOSE UR STRIP-MCNC: NEGATIVE MG/DL
HGB UR QL STRIP: ABNORMAL
KETONES UR STRIP-MCNC: NEGATIVE MG/DL
LEUKOCYTE ESTERASE UR QL STRIP: ABNORMAL
NITRATE UR QL: POSITIVE
PH UR STRIP: 6.5 [PH] (ref 5–7)
RBC #/AREA URNS AUTO: ABNORMAL /HPF
SP GR UR STRIP: 1.02 (ref 1–1.03)
UROBILINOGEN UR STRIP-ACNC: 0.2 E.U./DL
WBC #/AREA URNS AUTO: >100 /HPF
WBC CLUMPS #/AREA URNS HPF: PRESENT /HPF

## 2024-11-10 PROCEDURE — 81001 URINALYSIS AUTO W/SCOPE: CPT

## 2024-11-10 PROCEDURE — 99214 OFFICE O/P EST MOD 30 MIN: CPT | Mod: 25 | Performed by: PHYSICIAN ASSISTANT

## 2024-11-10 PROCEDURE — 87086 URINE CULTURE/COLONY COUNT: CPT | Performed by: PHYSICIAN ASSISTANT

## 2024-11-10 PROCEDURE — 96372 THER/PROPH/DIAG INJ SC/IM: CPT | Performed by: PHYSICIAN ASSISTANT

## 2024-11-10 RX ORDER — CEFTRIAXONE SODIUM 1 G
1000 VIAL (EA) INJECTION ONCE
Status: DISCONTINUED | OUTPATIENT
Start: 2024-11-10 | End: 2024-11-10

## 2024-11-10 RX ORDER — FLUCONAZOLE 150 MG/1
150 TABLET ORAL ONCE
Qty: 1 TABLET | Refills: 0 | Status: SHIPPED | OUTPATIENT
Start: 2024-11-10 | End: 2024-11-10

## 2024-11-10 RX ORDER — CEFTRIAXONE SODIUM 1 G
1 VIAL (EA) INJECTION ONCE
Status: ACTIVE | OUTPATIENT
Start: 2024-11-10

## 2024-11-10 RX ORDER — CEFTRIAXONE SODIUM 1 G
1 VIAL (EA) INJECTION ONCE
Status: COMPLETED | OUTPATIENT
Start: 2024-11-10 | End: 2024-11-10

## 2024-11-10 RX ORDER — CEFTRIAXONE SODIUM 1 G
1 VIAL (EA) INJECTION ONCE
Status: DISCONTINUED | OUTPATIENT
Start: 2024-11-10 | End: 2024-11-10

## 2024-11-10 RX ADMIN — Medication 1 G: at 15:47

## 2024-11-10 NOTE — NURSING NOTE
Clinic Administered Medication Documentation        Patient was given Rocephin. Prior to medication administration, verified patient's identity using patient s name and date of birth. Please see MAR and medication order for additional information. Patient instructed to remain in clinic for 15 minutes and report any adverse reaction to staff immediately.    Vial/Syringe: Single dose vial. Was entire vial of medication used? Yes  Stephanie Oh LPN

## 2024-11-10 NOTE — PATIENT INSTRUCTIONS
Patient was educated on the natural course of condition.  Take medications as directed. Side effects discussed. Conservative measures include drinking fluids (water), wiping from front to back, avoiding holding urine when there is urge to urinate, urinating before and after sexual intercourse, avoiding sexual activity until infection is eliminated, and over-the-counter AZO. See your primary care provider if symptoms do not improve in 3 days. Seek emergency care if you develop severe abdominal/flank pain, or fever.

## 2024-11-10 NOTE — PROGRESS NOTES
URGENT CARE VISIT:    SUBJECTIVE:    Dalila Rosario is a 39 year old female who  presents today for a possible UTI. Symptoms of dysuria, frequency, and back pain have been going on for 3 week(s). Symptoms were gradual onset and moderate.  Patient denies nausea, vomiting, fever, and chills or vaginal discharge. This patient does have a history of urinary tract infections. She self-caths.    PMH:   Past Medical History:   Diagnosis Date    Anxiety     Attempted suicide (H)     History of blood transfusion     Major depression     Myelodysplasia of the spinal cord (H)     OCD (obsessive compulsive disorder)     Other chronic pain      Allergies: Droperidol, Lidocaine, Benzoin tincture [benzoin], and Vancomycin   Medications:   Current Outpatient Medications   Medication Sig Dispense Refill    amoxicillin-clavulanate (AUGMENTIN) 875-125 MG tablet Take 1 tablet by mouth 2 times daily for 10 days. 20 tablet 0    buPROPion 450 MG TB24 Take 450 mg by mouth every morning 30 tablet 0    fluconazole (DIFLUCAN) 150 MG tablet Take 1 tablet (150 mg) by mouth once for 1 dose. 1 tablet 0    fluconazole (DIFLUCAN) 150 MG tablet Take 1 tablet (150 mg) by mouth every 7 days. 2 tablet 0    gabapentin (NEURONTIN) 400 MG capsule Take 400 mg by mouth 2 times daily      gabapentin (NEURONTIN) 400 MG capsule Take 800 mg by mouth At Bedtime      hydrOXYzine (ATARAX) 25 MG tablet Take 1-2 tablets (25-50 mg) by mouth every 4 hours as needed for other (adjuvant pain) 120 tablet 1    Mirtazapine (REMERON PO) Take 45 mg by mouth At Bedtime       QUEtiapine (SEROQUEL) 50 MG tablet Take 1-2 tablets ( mg) by mouth At Bedtime 45 tablet 0    acetaminophen (TYLENOL) 500 MG tablet Take 2 tablets by mouth (Patient not taking: Reported on 11/10/2024)      cephALEXin (KEFLEX) 500 MG capsule Take 1 capsule (500 mg) by mouth 2 times daily (Patient not taking: Reported on 11/10/2024) 14 capsule 0    fluconazole (DIFLUCAN) 150 MG tablet Take 1  tablet (150 mg) by mouth every 7 days (Patient not taking: Reported on 11/10/2024) 1 tablet 1    ondansetron (ZOFRAN ODT) 4 MG ODT tab Take 1 tablet (4 mg) by mouth every 8 hours as needed for nausea (Patient not taking: Reported on 11/10/2024) 10 tablet 0    ondansetron (ZOFRAN ODT) 4 MG ODT tab Take 1 tablet (4 mg) by mouth every 8 hours as needed for nausea (Patient not taking: Reported on 11/10/2024) 10 tablet 0     Social History:   Social History     Tobacco Use    Smoking status: Some Days     Current packs/day: 0.50     Types: Cigarettes    Smokeless tobacco: Never   Substance Use Topics    Alcohol use: Yes     Alcohol/week: 0.0 standard drinks of alcohol     Comment: not often       ROS:  Review of systems negative except as stated above.    OBJECTIVE:  /80 (BP Location: Right arm, Patient Position: Sitting, Cuff Size: Adult Regular)   Pulse 87   Temp 98.4  F (36.9  C) (Tympanic)   Resp 20   Wt 56.7 kg (125 lb)   SpO2 97%   BMI 31.29 kg/m    GENERAL APPEARANCE: healthy, alert and no distress  RESP: lungs clear to auscultation - no rales, rhonchi or wheezes  CV: regular rates and rhythm, normal S1 S2, no murmur noted  ABDOMEN:  soft, nontender, no HSM or masses and bowel sounds normal  BACK: Left CVA tenderness  SKIN: no suspicious lesions or rashes    Labs:    Results for orders placed or performed in visit on 11/10/24   UA Macroscopic with reflex to Microscopic and Culture - Lab Collect     Status: Abnormal    Specimen: Urine, Midstream   Result Value Ref Range    Color Urine Yellow Colorless, Straw, Light Yellow, Yellow    Appearance Urine Cloudy (A) Clear    Glucose Urine Negative Negative mg/dL    Bilirubin Urine Negative Negative    Ketones Urine Negative Negative mg/dL    Specific Gravity Urine 1.020 1.003 - 1.035    Blood Urine Large (A) Negative    pH Urine 6.5 5.0 - 7.0    Protein Albumin Urine >=300 (A) Negative mg/dL    Urobilinogen Urine 0.2 0.2, 1.0 E.U./dL    Nitrite Urine Positive  (A) Negative    Leukocyte Esterase Urine Moderate (A) Negative   Urine Microscopic Exam     Status: Abnormal   Result Value Ref Range    Bacteria Urine Moderate (A) None Seen /HPF    RBC Urine 10-25 (A) 0-2 /HPF /HPF    WBC Urine >100 (A) 0-5 /HPF /HPF    WBC Clumps Urine Present (A) None Seen /HPF       ASSESSMENT:     ICD-10-CM    1. Acute pyelonephritis  N10 UA Macroscopic with reflex to Microscopic and Culture - Lab Collect     Urine Microscopic Exam     Urine Culture     amoxicillin-clavulanate (AUGMENTIN) 875-125 MG tablet     fluconazole (DIFLUCAN) 150 MG tablet     cefTRIAXone (ROCEPHIN) in NS for IM administration 1 g     cefTRIAXone (ROCEPHIN) in lidocaine 1% (PF) for IM administration only 1 g     DISCONTINUED: cefTRIAXone (ROCEPHIN) in NS for IM administration 1 g     DISCONTINUED: cefTRIAXone (ROCEPHIN) in lidocaine 1% (PF) for IM administration only 1,000 mg          PLAN:  30 minutes spent by me on the date of the encounter doing chart review, review of outside records, review of test results, interpretation of tests, patient visit, and documentation   Patient Instructions   Patient was educated on the natural course of condition.  Take medications as directed. Side effects discussed. Conservative measures include drinking fluids (water), wiping from front to back, avoiding holding urine when there is urge to urinate, urinating before and after sexual intercourse, avoiding sexual activity until infection is eliminated, and over-the-counter AZO. See your primary care provider if symptoms do not improve in 3 days. Seek emergency care if you develop severe abdominal/flank pain, or fever.    Patient verbalized understanding and is agreeable to plan. The patient was discharged ambulatory and in stable condition.    iKana Huynh PA-C on 11/10/2024 at 3:45 PM

## 2024-11-12 LAB — BACTERIA UR CULT: NORMAL

## 2025-03-08 ENCOUNTER — OFFICE VISIT (OUTPATIENT)
Dept: URGENT CARE | Facility: URGENT CARE | Age: 40
End: 2025-03-08
Payer: COMMERCIAL

## 2025-03-08 VITALS
HEART RATE: 84 BPM | SYSTOLIC BLOOD PRESSURE: 114 MMHG | DIASTOLIC BLOOD PRESSURE: 73 MMHG | TEMPERATURE: 97.8 F | OXYGEN SATURATION: 97 %

## 2025-03-08 DIAGNOSIS — R35.0 FREQUENT URINATION: Primary | ICD-10-CM

## 2025-03-08 DIAGNOSIS — N30.01 ACUTE CYSTITIS WITH HEMATURIA: ICD-10-CM

## 2025-03-08 LAB
ALBUMIN UR-MCNC: >=300 MG/DL
APPEARANCE UR: CLEAR
BACTERIA #/AREA URNS HPF: ABNORMAL /HPF
BILIRUB UR QL STRIP: NEGATIVE
COLOR UR AUTO: YELLOW
GLUCOSE UR STRIP-MCNC: NEGATIVE MG/DL
HGB UR QL STRIP: ABNORMAL
KETONES UR STRIP-MCNC: NEGATIVE MG/DL
LEUKOCYTE ESTERASE UR QL STRIP: ABNORMAL
NITRATE UR QL: POSITIVE
PH UR STRIP: 6 [PH] (ref 5–7)
RBC #/AREA URNS AUTO: ABNORMAL /HPF
SP GR UR STRIP: 1.02 (ref 1–1.03)
SQUAMOUS #/AREA URNS AUTO: ABNORMAL /LPF
UROBILINOGEN UR STRIP-ACNC: 0.2 E.U./DL
WBC #/AREA URNS AUTO: >100 /HPF

## 2025-03-08 PROCEDURE — 3074F SYST BP LT 130 MM HG: CPT | Performed by: PHYSICIAN ASSISTANT

## 2025-03-08 PROCEDURE — 81001 URINALYSIS AUTO W/SCOPE: CPT | Performed by: PHYSICIAN ASSISTANT

## 2025-03-08 PROCEDURE — 3078F DIAST BP <80 MM HG: CPT | Performed by: PHYSICIAN ASSISTANT

## 2025-03-08 PROCEDURE — 99214 OFFICE O/P EST MOD 30 MIN: CPT | Performed by: PHYSICIAN ASSISTANT

## 2025-03-08 PROCEDURE — 87086 URINE CULTURE/COLONY COUNT: CPT | Performed by: PHYSICIAN ASSISTANT

## 2025-03-08 RX ORDER — GABAPENTIN 100 MG/1
100 CAPSULE ORAL 2 TIMES DAILY
COMMUNITY
Start: 2025-02-24

## 2025-03-08 RX ORDER — GABAPENTIN 300 MG/1
300 CAPSULE ORAL 2 TIMES DAILY
COMMUNITY
Start: 2025-02-14

## 2025-03-08 RX ORDER — CIPROFLOXACIN 500 MG/1
500 TABLET, FILM COATED ORAL 2 TIMES DAILY
Qty: 14 TABLET | Refills: 0 | Status: SHIPPED | OUTPATIENT
Start: 2025-03-08 | End: 2025-03-15

## 2025-03-08 NOTE — PROGRESS NOTES
SUBJECTIVE:   Dalila Rosario is a 40 year old female presenting with a chief complaint of   Chief Complaint   Patient presents with    Kidney Problem     Getting bladder stones removed soon. Kidney pain mainly in her left kidney and moving to right, nausea, frequency, urgency       She is an established patient of [facility].  Long history of bladder stones and UTI.  No fevers but has back pain.          Review of Systems    Past Medical History:   Diagnosis Date    Anxiety     Attempted suicide (H)     History of blood transfusion     Major depression     Myelodysplasia of the spinal cord (H)     OCD (obsessive compulsive disorder)     Other chronic pain      Family History   Problem Relation Age of Onset    Depression Mother     Substance Abuse Father     Depression Maternal Grandmother     Bipolar Disorder Maternal Grandmother     Depression Maternal Grandfather     Bipolar Disorder Maternal Grandfather     Substance Abuse Maternal Grandfather     Depression Brother     Substance Abuse Brother     Attention Deficit Disorder Brother     Anxiety Disorder Son     Depression Daughter     Anxiety Disorder Daughter     Anxiety Disorder Daughter      Current Outpatient Medications   Medication Sig Dispense Refill    buPROPion 450 MG TB24 Take 450 mg by mouth every morning 30 tablet 0    ciprofloxacin (CIPRO) 500 MG tablet Take 1 tablet (500 mg) by mouth 2 times daily for 7 days. 14 tablet 0    gabapentin (NEURONTIN) 100 MG capsule Take 100 mg by mouth 2 times daily.      gabapentin (NEURONTIN) 300 MG capsule Take 300 mg by mouth 2 times daily.      ondansetron (ZOFRAN ODT) 4 MG ODT tab Take 1 tablet (4 mg) by mouth every 8 hours as needed for nausea 10 tablet 0    acetaminophen (TYLENOL) 500 MG tablet Take 2 tablets by mouth (Patient not taking: Reported on 3/8/2025)      cephALEXin (KEFLEX) 500 MG capsule Take 1 capsule (500 mg) by mouth 2 times daily (Patient not taking: Reported on 7/17/2023) 14 capsule 0     fluconazole (DIFLUCAN) 150 MG tablet Take 1 tablet (150 mg) by mouth every 7 days. (Patient not taking: Reported on 3/8/2025) 2 tablet 0    fluconazole (DIFLUCAN) 150 MG tablet Take 1 tablet (150 mg) by mouth every 7 days (Patient not taking: Reported on 11/18/2023) 1 tablet 1    gabapentin (NEURONTIN) 400 MG capsule Take 400 mg by mouth 2 times daily (Patient not taking: Reported on 3/8/2025)      gabapentin (NEURONTIN) 400 MG capsule Take 800 mg by mouth At Bedtime (Patient not taking: Reported on 3/8/2025)      hydrOXYzine (ATARAX) 25 MG tablet Take 1-2 tablets (25-50 mg) by mouth every 4 hours as needed for other (adjuvant pain) (Patient not taking: Reported on 3/8/2025) 120 tablet 1    Mirtazapine (REMERON PO) Take 45 mg by mouth At Bedtime  (Patient not taking: Reported on 3/8/2025)      ondansetron (ZOFRAN ODT) 4 MG ODT tab Take 1 tablet (4 mg) by mouth every 8 hours as needed for nausea (Patient not taking: Reported on 3/8/2025) 10 tablet 0    QUEtiapine (SEROQUEL) 50 MG tablet Take 1-2 tablets ( mg) by mouth At Bedtime (Patient not taking: Reported on 3/8/2025) 45 tablet 0     Social History     Tobacco Use    Smoking status: Some Days     Current packs/day: 0.50     Types: Cigarettes    Smokeless tobacco: Never   Substance Use Topics    Alcohol use: Yes     Alcohol/week: 0.0 standard drinks of alcohol     Comment: not often       OBJECTIVE  /73   Pulse 84   Temp 97.8  F (36.6  C)   SpO2 97%     Physical Exam  Vitals and nursing note reviewed.   Constitutional:       Appearance: Normal appearance. She is normal weight.   Eyes:      Extraocular Movements: Extraocular movements intact.      Conjunctiva/sclera: Conjunctivae normal.   Cardiovascular:      Rate and Rhythm: Normal rate and regular rhythm.      Pulses: Normal pulses.      Heart sounds: Normal heart sounds.   Pulmonary:      Effort: Pulmonary effort is normal.      Breath sounds: Normal breath sounds.   Abdominal:      Tenderness:  There is left CVA tenderness. There is no right CVA tenderness.   Skin:     General: Skin is warm and dry.   Neurological:      General: No focal deficit present.      Mental Status: She is alert.   Psychiatric:         Mood and Affect: Mood normal.         Behavior: Behavior normal.         Labs:  Results for orders placed or performed in visit on 03/08/25 (from the past 24 hours)   UA Macroscopic with reflex to Microscopic and Culture - Clinic Collect    Specimen: Urine, Clean Catch   Result Value Ref Range    Color Urine Yellow Colorless, Straw, Light Yellow, Yellow    Appearance Urine Clear Clear    Glucose Urine Negative Negative mg/dL    Bilirubin Urine Negative Negative    Ketones Urine Negative Negative mg/dL    Specific Gravity Urine 1.020 1.003 - 1.035    Blood Urine Moderate (A) Negative    pH Urine 6.0 5.0 - 7.0    Protein Albumin Urine >=300 (A) Negative mg/dL    Urobilinogen Urine 0.2 0.2, 1.0 E.U./dL    Nitrite Urine Positive (A) Negative    Leukocyte Esterase Urine Moderate (A) Negative   Urine Microscopic Exam   Result Value Ref Range    Bacteria Urine Moderate (A) None Seen /HPF    RBC Urine 10-25 (A) 0-2 /HPF /HPF    WBC Urine >100 (A) 0-5 /HPF /HPF    Squamous Epithelials Urine Few (A) None Seen /LPF         ASSESSMENT:      ICD-10-CM    1. Frequent urination  R35.0 UA Macroscopic with reflex to Microscopic and Culture - Clinic Collect     Urine Microscopic Exam     Urine Culture      2. Acute cystitis with hematuria  N30.01 ciprofloxacin (CIPRO) 500 MG tablet           Medical Decision Making:    Differential Diagnosis:  UTI, pylo,     Serious Comorbid Conditions:  Reviewed      PLAN:    Declined injection.  Rx for cipro.  Urine culture pending.  Increase fluid intake. Discussed reasons to seek immediate medical attention - specifically, fevers or vomiting.  Finish all medications.        Followup:    If not improving or if condition worsens, follow up with your Primary Care Provider, If not  improving or if conditions worsens over the next 12-24 hours, go to the Emergency Department    There are no Patient Instructions on file for this visit.

## 2025-03-09 LAB — BACTERIA UR CULT: NORMAL

## 2025-04-18 ENCOUNTER — OFFICE VISIT (OUTPATIENT)
Dept: URGENT CARE | Facility: URGENT CARE | Age: 40
End: 2025-04-18
Payer: COMMERCIAL

## 2025-04-18 VITALS
OXYGEN SATURATION: 96 % | RESPIRATION RATE: 20 BRPM | TEMPERATURE: 98 F | HEART RATE: 55 BPM | DIASTOLIC BLOOD PRESSURE: 78 MMHG | SYSTOLIC BLOOD PRESSURE: 134 MMHG

## 2025-04-18 DIAGNOSIS — R11.0 NAUSEA: ICD-10-CM

## 2025-04-18 DIAGNOSIS — R30.0 DYSURIA: Primary | ICD-10-CM

## 2025-04-18 LAB
ALBUMIN UR-MCNC: NEGATIVE MG/DL
APPEARANCE UR: CLEAR
BILIRUB UR QL STRIP: NEGATIVE
C TRACH DNA SPEC QL PROBE+SIG AMP: NEGATIVE
CLUE CELLS: ABNORMAL
COLOR UR AUTO: YELLOW
ERYTHROCYTE [DISTWIDTH] IN BLOOD BY AUTOMATED COUNT: 12.3 % (ref 10–15)
GLUCOSE UR STRIP-MCNC: NEGATIVE MG/DL
HCG INTACT+B SERPL-ACNC: <1 MIU/ML
HCT VFR BLD AUTO: 41.8 % (ref 35–47)
HGB BLD-MCNC: 14.1 G/DL (ref 11.7–15.7)
HGB UR QL STRIP: NEGATIVE
KETONES UR STRIP-MCNC: NEGATIVE MG/DL
LEUKOCYTE ESTERASE UR QL STRIP: NEGATIVE
MCH RBC QN AUTO: 31.3 PG (ref 26.5–33)
MCHC RBC AUTO-ENTMCNC: 33.7 G/DL (ref 31.5–36.5)
MCV RBC AUTO: 93 FL (ref 78–100)
N GONORRHOEA DNA SPEC QL NAA+PROBE: NEGATIVE
NITRATE UR QL: NEGATIVE
PH UR STRIP: 7 [PH] (ref 5–7)
PLATELET # BLD AUTO: 308 10E3/UL (ref 150–450)
RBC # BLD AUTO: 4.51 10E6/UL (ref 3.8–5.2)
SP GR UR STRIP: 1.01 (ref 1–1.03)
SPECIMEN TYPE: NORMAL
TRICHOMONAS, WET PREP: ABNORMAL
UROBILINOGEN UR STRIP-ACNC: 0.2 E.U./DL
WBC # BLD AUTO: 7.4 10E3/UL (ref 4–11)
WBC'S/HIGH POWER FIELD, WET PREP: ABNORMAL
YEAST, WET PREP: ABNORMAL

## 2025-04-18 PROCEDURE — 3075F SYST BP GE 130 - 139MM HG: CPT | Performed by: FAMILY MEDICINE

## 2025-04-18 PROCEDURE — 36415 COLL VENOUS BLD VENIPUNCTURE: CPT | Performed by: FAMILY MEDICINE

## 2025-04-18 PROCEDURE — 84702 CHORIONIC GONADOTROPIN TEST: CPT | Performed by: FAMILY MEDICINE

## 2025-04-18 PROCEDURE — 99214 OFFICE O/P EST MOD 30 MIN: CPT | Performed by: FAMILY MEDICINE

## 2025-04-18 PROCEDURE — 87591 N.GONORRHOEAE DNA AMP PROB: CPT | Performed by: FAMILY MEDICINE

## 2025-04-18 PROCEDURE — 87491 CHLMYD TRACH DNA AMP PROBE: CPT | Performed by: FAMILY MEDICINE

## 2025-04-18 PROCEDURE — 85027 COMPLETE CBC AUTOMATED: CPT | Performed by: FAMILY MEDICINE

## 2025-04-18 PROCEDURE — 81003 URINALYSIS AUTO W/O SCOPE: CPT | Performed by: FAMILY MEDICINE

## 2025-04-18 PROCEDURE — 87210 SMEAR WET MOUNT SALINE/INK: CPT | Performed by: FAMILY MEDICINE

## 2025-04-18 PROCEDURE — 87086 URINE CULTURE/COLONY COUNT: CPT | Performed by: FAMILY MEDICINE

## 2025-04-18 PROCEDURE — 3078F DIAST BP <80 MM HG: CPT | Performed by: FAMILY MEDICINE

## 2025-04-18 NOTE — PROGRESS NOTES
Urgent Care Clinic Visit    Chief Complaint   Patient presents with    UTI               4/18/2025    10:11 AM   Additional Questions   Roomed by анна headley   Accompanied by self     Pre-Provider Visit Orders- Urinalysis UA/UC  Patient reports the following symptoms:  possible urinary tract infection (UTI)  and frequent urination   Does the patient report any of the following symptoms: vaginal discharge, vaginal itching, possible yeast infection, has a urinary catheter in place, or unable to void in a specimen cup?  Patient complains of vaginal itching

## 2025-04-18 NOTE — PATIENT INSTRUCTIONS
push fluids. Call or return to clinic prn if these symptoms worsen or fail to improve as anticipated.    Reina Robbins MD

## 2025-04-18 NOTE — PROGRESS NOTES
Chief Complaint   Patient presents with    UTI   Dalila was seen today for uti.    Diagnoses and all orders for this visit:    Dysuria  -     UA Macroscopic with reflex to Microscopic and Culture - Clinic Collect  -     HCG qualitative urine POCT, Enter/Edit Result  -     Chlamydia trachomatis/Neisseria gonorrhoeae by PCR - Clinic Collect  -     Wet prep - Clinic Collect  -     Urine Culture Aerobic Bacterial - lab collect; Future    Nausea  -     CBC with platelets; Future  -     HCG quantitative pregnancy      D/d-UTI/pyelonephritis/kidney stone/viral  syndrome/    PLAN: Treatment per orders - also push fluids, may use Pyridium OTC prn. Call or return to clinic prn if these symptoms worsen or fail to improve as anticipated.  Reviewed results with patient because of her nausea would consider doing a blood pregnancy test and also check a CBC.  Patient did multiple home pregnancy test which were positive.  Advised patient to push more fluids.  Should follow-up if symptoms do not get  Reviewed results with patient       did spent>35 minutes with patient and > 50% of the time was for answering questions, discussing findings, counseling and coordination of care       SUBJECTIVE: Dalila Rosario is a 40 year old female history of neurogenic bladder.,  Who complains of urinary frequency, urgency and dysuria for several days.  Without flank pain, fever, chills, or abnormal vaginal discharge or bleeding.  Was started on Macrobid she has been feeling nauseous, patient refuses to take Macrobid.  Her last cycle was 1-1/2 months back did several home pregnancy test which were positive.    OBJECTIVE: Appears well, in no apparent distress.wheel chair bound   Vital signs are normal. The abdomen is soft without tenderness, guarding, mass, rebound or organomegaly. No CVA tenderness Urine     Results for orders placed or performed in visit on 04/18/25   UA Macroscopic with reflex to Microscopic and Culture - Clinic Collect      Status: Normal    Specimen: Urine, Clean Catch   Result Value Ref Range    Color Urine Yellow Colorless, Straw, Light Yellow, Yellow    Appearance Urine Clear Clear    Glucose Urine Negative Negative mg/dL    Bilirubin Urine Negative Negative    Ketones Urine Negative Negative mg/dL    Specific Gravity Urine 1.010 1.003 - 1.035    Blood Urine Negative Negative    pH Urine 7.0 5.0 - 7.0    Protein Albumin Urine Negative Negative mg/dL    Urobilinogen Urine 0.2 0.2, 1.0 E.U./dL    Nitrite Urine Negative Negative    Leukocyte Esterase Urine Negative Negative    Narrative    Microscopic not indicated   Wet prep - Clinic Collect     Status: Abnormal    Specimen: Vagina; Swab   Result Value Ref Range    Trichomonas Absent Absent    Yeast Absent Absent    Clue Cells Absent Absent    WBCs/high power field 1+ (A) None   Reina Robbins MD

## 2025-04-19 LAB — BACTERIA UR CULT: NO GROWTH
